# Patient Record
Sex: MALE | Race: WHITE | NOT HISPANIC OR LATINO | Employment: OTHER | URBAN - METROPOLITAN AREA
[De-identification: names, ages, dates, MRNs, and addresses within clinical notes are randomized per-mention and may not be internally consistent; named-entity substitution may affect disease eponyms.]

---

## 2023-02-22 LAB
ALANINE AMINOTRANSFERASE (SGPT) (U/L) IN SER/PLAS: 36 U/L (ref 10–52)
ALBUMIN (G/DL) IN SER/PLAS: 4.3 G/DL (ref 3.4–5)
ALKALINE PHOSPHATASE (U/L) IN SER/PLAS: 40 U/L (ref 33–136)
ANION GAP IN SER/PLAS: 15 MMOL/L (ref 10–20)
ASPARTATE AMINOTRANSFERASE (SGOT) (U/L) IN SER/PLAS: 28 U/L (ref 9–39)
BILIRUBIN TOTAL (MG/DL) IN SER/PLAS: 2.3 MG/DL (ref 0–1.2)
CALCIUM (MG/DL) IN SER/PLAS: 9.7 MG/DL (ref 8.6–10.3)
CARBON DIOXIDE, TOTAL (MMOL/L) IN SER/PLAS: 22 MMOL/L (ref 21–32)
CHLORIDE (MMOL/L) IN SER/PLAS: 104 MMOL/L (ref 98–107)
CHOLESTEROL (MG/DL) IN SER/PLAS: 108 MG/DL (ref 0–199)
CHOLESTEROL IN HDL (MG/DL) IN SER/PLAS: 28 MG/DL
CHOLESTEROL/HDL RATIO: 3.9
CREATININE (MG/DL) IN SER/PLAS: 0.8 MG/DL (ref 0.5–1.3)
ERYTHROCYTE DISTRIBUTION WIDTH (RATIO) BY AUTOMATED COUNT: 13.6 % (ref 11.5–14.5)
ERYTHROCYTE MEAN CORPUSCULAR HEMOGLOBIN CONCENTRATION (G/DL) BY AUTOMATED: 33.1 G/DL (ref 32–36)
ERYTHROCYTE MEAN CORPUSCULAR VOLUME (FL) BY AUTOMATED COUNT: 91 FL (ref 80–100)
ERYTHROCYTES (10*6/UL) IN BLOOD BY AUTOMATED COUNT: 5.52 X10E12/L (ref 4.5–5.9)
GFR MALE: >90 ML/MIN/1.73M2
GLUCOSE (MG/DL) IN SER/PLAS: 108 MG/DL (ref 74–99)
HEMATOCRIT (%) IN BLOOD BY AUTOMATED COUNT: 50.4 % (ref 41–52)
HEMOGLOBIN (G/DL) IN BLOOD: 16.7 G/DL (ref 13.5–17.5)
LDL: 44 MG/DL (ref 0–99)
LEUKOCYTES (10*3/UL) IN BLOOD BY AUTOMATED COUNT: 11 X10E9/L (ref 4.4–11.3)
PLATELETS (10*3/UL) IN BLOOD AUTOMATED COUNT: 169 X10E9/L (ref 150–450)
POTASSIUM (MMOL/L) IN SER/PLAS: 4.2 MMOL/L (ref 3.5–5.3)
PROSTATE SPECIFIC AG (NG/ML) IN SER/PLAS: 2.03 NG/ML (ref 0–4)
PROTEIN TOTAL: 6.8 G/DL (ref 6.4–8.2)
SODIUM (MMOL/L) IN SER/PLAS: 137 MMOL/L (ref 136–145)
THYROTROPIN (MIU/L) IN SER/PLAS BY DETECTION LIMIT <= 0.05 MIU/L: 3.64 MIU/L (ref 0.44–3.98)
TRIGLYCERIDE (MG/DL) IN SER/PLAS: 180 MG/DL (ref 0–149)
UREA NITROGEN (MG/DL) IN SER/PLAS: 14 MG/DL (ref 6–23)
VLDL: 36 MG/DL (ref 0–40)

## 2023-02-23 PROBLEM — R07.9 CHEST PAIN: Status: ACTIVE | Noted: 2023-02-23

## 2023-02-23 PROBLEM — M79.605 BILATERAL LEG AND FOOT PAIN: Status: ACTIVE | Noted: 2023-02-23

## 2023-02-23 PROBLEM — I73.9 PVD (PERIPHERAL VASCULAR DISEASE) (CMS-HCC): Status: ACTIVE | Noted: 2023-02-23

## 2023-02-23 PROBLEM — N40.0 BENIGN PROSTATIC HYPERPLASIA WITHOUT URINARY OBSTRUCTION: Status: ACTIVE | Noted: 2023-02-23

## 2023-02-23 PROBLEM — R94.39 ABNORMAL STRESS ECG WITH TREADMILL: Status: ACTIVE | Noted: 2023-02-23

## 2023-02-23 PROBLEM — E78.5 HYPERLIPIDEMIA: Status: ACTIVE | Noted: 2023-02-23

## 2023-02-23 PROBLEM — K06.1 GINGIVAL HYPERPLASIA: Status: ACTIVE | Noted: 2023-02-23

## 2023-02-23 PROBLEM — I10 BENIGN ESSENTIAL HYPERTENSION: Status: ACTIVE | Noted: 2023-02-23

## 2023-02-23 PROBLEM — R93.1 ELEVATED CORONARY ARTERY CALCIUM SCORE: Status: ACTIVE | Noted: 2023-02-23

## 2023-02-23 PROBLEM — J32.9 SINOBRONCHITIS: Status: ACTIVE | Noted: 2023-02-23

## 2023-02-23 PROBLEM — B35.1 MYCOTIC TOENAILS: Status: ACTIVE | Noted: 2023-02-23

## 2023-02-23 PROBLEM — M79.671 BILATERAL LEG AND FOOT PAIN: Status: ACTIVE | Noted: 2023-02-23

## 2023-02-23 PROBLEM — L60.0 INGROWN TOENAIL: Status: ACTIVE | Noted: 2023-02-23

## 2023-02-23 PROBLEM — M79.672 BILATERAL LEG AND FOOT PAIN: Status: ACTIVE | Noted: 2023-02-23

## 2023-02-23 PROBLEM — E03.9 ADULT HYPOTHYROIDISM: Status: ACTIVE | Noted: 2023-02-23

## 2023-02-23 PROBLEM — E66.812 CLASS 2 OBESITY DUE TO EXCESS CALORIES WITH BODY MASS INDEX (BMI) OF 35.0 TO 35.9 IN ADULT: Status: ACTIVE | Noted: 2023-02-23

## 2023-02-23 PROBLEM — M79.604 BILATERAL LEG AND FOOT PAIN: Status: ACTIVE | Noted: 2023-02-23

## 2023-02-23 PROBLEM — E66.09 CLASS 2 OBESITY DUE TO EXCESS CALORIES WITH BODY MASS INDEX (BMI) OF 35.0 TO 35.9 IN ADULT: Status: ACTIVE | Noted: 2023-02-23

## 2023-02-23 PROBLEM — L57.0 ACTINIC KERATOSIS: Status: ACTIVE | Noted: 2023-02-23

## 2023-02-23 PROBLEM — R10.811 RIGHT UPPER QUADRANT ABDOMINAL TENDERNESS: Status: ACTIVE | Noted: 2023-02-23

## 2023-02-23 PROBLEM — J40 SINOBRONCHITIS: Status: ACTIVE | Noted: 2023-02-23

## 2023-02-23 PROBLEM — R74.8 ELEVATED LIVER ENZYMES: Status: ACTIVE | Noted: 2023-02-23

## 2023-02-23 RX ORDER — METOPROLOL SUCCINATE 100 MG/1
1 TABLET, EXTENDED RELEASE ORAL DAILY
COMMUNITY
End: 2024-02-26 | Stop reason: SDUPTHER

## 2023-02-23 RX ORDER — ROSUVASTATIN CALCIUM 10 MG/1
1 TABLET, COATED ORAL DAILY
COMMUNITY
Start: 2017-01-10 | End: 2023-03-28 | Stop reason: SDUPTHER

## 2023-02-23 RX ORDER — LEVOTHYROXINE SODIUM 50 UG/1
50 TABLET ORAL
COMMUNITY
Start: 2020-09-28 | End: 2023-03-28 | Stop reason: SDUPTHER

## 2023-02-23 RX ORDER — NAPROXEN SODIUM 220 MG/1
TABLET, FILM COATED ORAL
COMMUNITY
Start: 2019-06-06

## 2023-03-28 ENCOUNTER — TELEMEDICINE (OUTPATIENT)
Dept: PRIMARY CARE | Facility: CLINIC | Age: 73
End: 2023-03-28
Payer: MEDICARE

## 2023-03-28 VITALS
DIASTOLIC BLOOD PRESSURE: 78 MMHG | HEIGHT: 72 IN | SYSTOLIC BLOOD PRESSURE: 134 MMHG | BODY MASS INDEX: 34.13 KG/M2 | WEIGHT: 252 LBS

## 2023-03-28 DIAGNOSIS — E03.9 ADULT HYPOTHYROIDISM: ICD-10-CM

## 2023-03-28 DIAGNOSIS — E78.2 MIXED HYPERLIPIDEMIA: ICD-10-CM

## 2023-03-28 DIAGNOSIS — R17 SERUM TOTAL BILIRUBIN ELEVATED: ICD-10-CM

## 2023-03-28 DIAGNOSIS — I73.9 PVD (PERIPHERAL VASCULAR DISEASE) (CMS-HCC): ICD-10-CM

## 2023-03-28 DIAGNOSIS — N40.0 BENIGN PROSTATIC HYPERPLASIA WITHOUT URINARY OBSTRUCTION: ICD-10-CM

## 2023-03-28 DIAGNOSIS — E66.09 CLASS 1 OBESITY DUE TO EXCESS CALORIES WITHOUT SERIOUS COMORBIDITY WITH BODY MASS INDEX (BMI) OF 34.0 TO 34.9 IN ADULT: ICD-10-CM

## 2023-03-28 DIAGNOSIS — E80.6 ACQUIRED HYPERBILIRUBINEMIA: ICD-10-CM

## 2023-03-28 DIAGNOSIS — Z00.00 MEDICARE ANNUAL WELLNESS VISIT, SUBSEQUENT: Primary | ICD-10-CM

## 2023-03-28 DIAGNOSIS — I10 BENIGN ESSENTIAL HYPERTENSION: ICD-10-CM

## 2023-03-28 DIAGNOSIS — R93.1 ELEVATED CORONARY ARTERY CALCIUM SCORE: ICD-10-CM

## 2023-03-28 PROBLEM — E66.811 CLASS 1 OBESITY DUE TO EXCESS CALORIES WITHOUT SERIOUS COMORBIDITY WITH BODY MASS INDEX (BMI) OF 34.0 TO 34.9 IN ADULT: Status: ACTIVE | Noted: 2023-02-23

## 2023-03-28 PROBLEM — R74.8 ELEVATED LIVER ENZYMES: Status: RESOLVED | Noted: 2023-02-23 | Resolved: 2023-03-28

## 2023-03-28 PROCEDURE — G0439 PPPS, SUBSEQ VISIT: HCPCS | Performed by: FAMILY MEDICINE

## 2023-03-28 RX ORDER — LEVOTHYROXINE SODIUM 50 UG/1
50 TABLET ORAL DAILY
Qty: 90 TABLET | Refills: 3 | Status: SHIPPED | OUTPATIENT
Start: 2023-03-28 | End: 2024-02-26 | Stop reason: SDUPTHER

## 2023-03-28 RX ORDER — ROSUVASTATIN CALCIUM 10 MG/1
10 TABLET, COATED ORAL DAILY
Qty: 90 TABLET | Refills: 3 | Status: SHIPPED | OUTPATIENT
Start: 2023-03-28 | End: 2024-02-26 | Stop reason: SDUPTHER

## 2023-03-28 ASSESSMENT — ACTIVITIES OF DAILY LIVING (ADL)
BATHING: INDEPENDENT
TAKING_MEDICATION: INDEPENDENT
MANAGING_FINANCES: INDEPENDENT
GROCERY_SHOPPING: INDEPENDENT
DRESSING: INDEPENDENT
DOING_HOUSEWORK: INDEPENDENT

## 2023-03-28 ASSESSMENT — ENCOUNTER SYMPTOMS
DEPRESSION: 0
LOSS OF SENSATION IN FEET: 0
OCCASIONAL FEELINGS OF UNSTEADINESS: 0

## 2023-03-28 ASSESSMENT — PATIENT HEALTH QUESTIONNAIRE - PHQ9
2. FEELING DOWN, DEPRESSED OR HOPELESS: NOT AT ALL
SUM OF ALL RESPONSES TO PHQ9 QUESTIONS 1 AND 2: 0
1. LITTLE INTEREST OR PLEASURE IN DOING THINGS: NOT AT ALL

## 2023-03-28 NOTE — ASSESSMENT & PLAN NOTE
Continues to be elevated and a bit higher. Plan to start with ultrasound, but may need CT of abdomen if inconclusive. Looking at liver, gall ladder and pancreas.

## 2023-03-28 NOTE — PROGRESS NOTES
"Subjective   Reason for Visit: Yadiel Solano is an 73 y.o. male here for a Medicare Wellness visit.   Just returned from cruise, and had a nice time, [place] [facility], [facility], [place]   Past Medical, Surgical, and Family History reviewed and updated in chart.    Reviewed all medications by prescribing practitioner or clinical pharmacist (such as prescriptions, OTCs, herbal therapies and supplements) and documented in the medical record.    Med Refill      Medications reconciled, no change  Patient Care Team:  Jennifer Willis MD as PCP - General     Review of Systems   All other systems reviewed and are negative.      Objective   Vitals:  /78   Ht 1.816 m (5' 11.5\")   Wt 114 kg (252 lb)   BMI 34.66 kg/m²       Physical Exam  Vitals reviewed.     An interactive audio and video telecommunication system which permits real time communications between the patient (at the originating site) and provider (at a distant site) was utilized to provider this telehealth service.     Assessment/Plan   Problem List Items Addressed This Visit          Circulatory    Benign essential hypertension    Elevated coronary artery calcium score    PVD (peripheral vascular disease) (CMS/[facility])       Genitourinary    Benign prostatic hyperplasia without urinary obstruction       Endocrine/Metabolic    Adult hypothyroidism    Relevant Medications    levothyroxine (Synthroid, Levoxyl) 50 mcg tablet    Class 1 obesity due to excess calories without serious comorbidity with body mass index (BMI) of 34.0 to 34.9 in adult       Other    Hyperlipidemia    Acquired hyperbilirubinemia    Current Assessment & Plan     Continues to be elevated and a bit higher. Plan to start with ultrasound, but may need CT of abdomen if inconclusive. Looking at liver, gall ladder and pancreas.          Relevant Medications    rosuvastatin (Crestor) 10 mg tablet     Other Visit Diagnoses       Medicare annual wellness visit, subsequent    -  Primary    " Serum total bilirubin elevated

## 2023-05-03 NOTE — RESULT ENCOUNTER NOTE
Okay Yadiel, your liver and gall bladder looked good, and they did not feel that was the reason that your bilirubin was up, however, they suggested either a HIDA scan or a surgical referral to further determine if the gall bladder needs to come out because there are stones. Let me know your thoughts! (Sent via My chart message as well)

## 2023-05-04 ENCOUNTER — TELEPHONE (OUTPATIENT)
Dept: PRIMARY CARE | Facility: CLINIC | Age: 73
End: 2023-05-04
Payer: MEDICARE

## 2023-05-04 DIAGNOSIS — E80.6 ACQUIRED HYPERBILIRUBINEMIA: Primary | ICD-10-CM

## 2023-05-04 DIAGNOSIS — K80.20 CALCULUS OF GALLBLADDER WITHOUT CHOLECYSTITIS WITHOUT OBSTRUCTION: ICD-10-CM

## 2023-05-04 NOTE — TELEPHONE ENCOUNTER
Pt states you sent email about gall bladder. He would like more information about the other test and would also like to know if it would be easier to just have the gall bladder out.

## 2023-05-26 NOTE — RESULT ENCOUNTER NOTE
So gall bladder function test showed decreased contractility and  decreased ejection fraction. This means your gall badder may need to come out. The next step would be to talk to  surgeon- which hospital would you like to do that at?

## 2023-06-26 ENCOUNTER — APPOINTMENT (OUTPATIENT)
Dept: LAB | Facility: LAB | Age: 73
End: 2023-06-26
Payer: MEDICARE

## 2023-06-26 LAB
ANION GAP IN SER/PLAS: 13 MMOL/L (ref 10–20)
CALCIUM (MG/DL) IN SER/PLAS: 9.5 MG/DL (ref 8.6–10.3)
CARBON DIOXIDE, TOTAL (MMOL/L) IN SER/PLAS: 27 MMOL/L (ref 21–32)
CHLORIDE (MMOL/L) IN SER/PLAS: 102 MMOL/L (ref 98–107)
CREATININE (MG/DL) IN SER/PLAS: 0.93 MG/DL (ref 0.5–1.3)
ERYTHROCYTE DISTRIBUTION WIDTH (RATIO) BY AUTOMATED COUNT: 14.1 % (ref 11.5–14.5)
ERYTHROCYTE MEAN CORPUSCULAR HEMOGLOBIN CONCENTRATION (G/DL) BY AUTOMATED: 32.4 G/DL (ref 32–36)
ERYTHROCYTE MEAN CORPUSCULAR VOLUME (FL) BY AUTOMATED COUNT: 92 FL (ref 80–100)
ERYTHROCYTES (10*6/UL) IN BLOOD BY AUTOMATED COUNT: 5.57 X10E12/L (ref 4.5–5.9)
GFR MALE: 88 ML/MIN/1.73M2
GLUCOSE (MG/DL) IN SER/PLAS: 115 MG/DL (ref 74–99)
HEMATOCRIT (%) IN BLOOD BY AUTOMATED COUNT: 51.3 % (ref 41–52)
HEMOGLOBIN (G/DL) IN BLOOD: 16.6 G/DL (ref 13.5–17.5)
LEUKOCYTES (10*3/UL) IN BLOOD BY AUTOMATED COUNT: 12.3 X10E9/L (ref 4.4–11.3)
PLATELETS (10*3/UL) IN BLOOD AUTOMATED COUNT: 167 X10E9/L (ref 150–450)
POTASSIUM (MMOL/L) IN SER/PLAS: 4.2 MMOL/L (ref 3.5–5.3)
SODIUM (MMOL/L) IN SER/PLAS: 138 MMOL/L (ref 136–145)
UREA NITROGEN (MG/DL) IN SER/PLAS: 16 MG/DL (ref 6–23)

## 2023-07-18 ENCOUNTER — HOSPITAL ENCOUNTER (OUTPATIENT)
Dept: DATA CONVERSION | Facility: HOSPITAL | Age: 73
End: 2023-07-19
Attending: SURGERY | Admitting: SURGERY
Payer: MEDICARE

## 2023-07-18 DIAGNOSIS — Z79.82 LONG TERM (CURRENT) USE OF ASPIRIN: ICD-10-CM

## 2023-07-18 DIAGNOSIS — N40.0 BENIGN PROSTATIC HYPERPLASIA WITHOUT LOWER URINARY TRACT SYMPTOMS: ICD-10-CM

## 2023-07-18 DIAGNOSIS — E78.5 HYPERLIPIDEMIA, UNSPECIFIED: ICD-10-CM

## 2023-07-18 DIAGNOSIS — E66.9 OBESITY, UNSPECIFIED: ICD-10-CM

## 2023-07-18 DIAGNOSIS — K80.10 CALCULUS OF GALLBLADDER WITH CHRONIC CHOLECYSTITIS WITHOUT OBSTRUCTION: ICD-10-CM

## 2023-07-18 DIAGNOSIS — I73.9 PERIPHERAL VASCULAR DISEASE, UNSPECIFIED (CMS-HCC): ICD-10-CM

## 2023-07-18 DIAGNOSIS — I10 ESSENTIAL (PRIMARY) HYPERTENSION: ICD-10-CM

## 2023-07-18 DIAGNOSIS — Z88.0 ALLERGY STATUS TO PENICILLIN: ICD-10-CM

## 2023-07-18 DIAGNOSIS — Z23 ENCOUNTER FOR IMMUNIZATION: ICD-10-CM

## 2023-07-18 DIAGNOSIS — K81.9 CHOLECYSTITIS, UNSPECIFIED: ICD-10-CM

## 2023-07-18 DIAGNOSIS — K80.20 CALCULUS OF GALLBLADDER WITHOUT CHOLECYSTITIS WITHOUT OBSTRUCTION: ICD-10-CM

## 2023-07-18 DIAGNOSIS — E03.9 HYPOTHYROIDISM, UNSPECIFIED: ICD-10-CM

## 2023-07-22 LAB
ATRIAL RATE: 70 BPM
P AXIS: 56 DEGREES
P OFFSET: 196 MS
P ONSET: 137 MS
PR INTERVAL: 170 MS
Q ONSET: 222 MS
QRS COUNT: 12 BEATS
QRS DURATION: 92 MS
QT INTERVAL: 408 MS
QTC CALCULATION(BAZETT): 440 MS
QTC FREDERICIA: 429 MS
R AXIS: -1 DEGREES
T AXIS: 31 DEGREES
T OFFSET: 426 MS
VENTRICULAR RATE: 70 BPM

## 2023-07-31 LAB
COMPLETE PATHOLOGY REPORT: NORMAL
CONVERTED CLINICAL DIAGNOSIS-HISTORY: NORMAL
CONVERTED FINAL DIAGNOSIS: NORMAL
CONVERTED FINAL REPORT PDF LINK TO COPY AND PASTE: NORMAL
CONVERTED GROSS DESCRIPTION: NORMAL
ERYTHROCYTE DISTRIBUTION WIDTH (RATIO) BY AUTOMATED COUNT: 14.8 % (ref 11.5–14.5)
ERYTHROCYTE MEAN CORPUSCULAR HEMOGLOBIN CONCENTRATION (G/DL) BY AUTOMATED: 31.3 G/DL (ref 32–36)
ERYTHROCYTE MEAN CORPUSCULAR VOLUME (FL) BY AUTOMATED COUNT: 95 FL (ref 80–100)
ERYTHROCYTES (10*6/UL) IN BLOOD BY AUTOMATED COUNT: 4.75 X10E12/L (ref 4.5–5.9)
HEMATOCRIT (%) IN BLOOD BY AUTOMATED COUNT: 45.3 % (ref 41–52)
HEMOGLOBIN (G/DL) IN BLOOD: 14.2 G/DL (ref 13.5–17.5)
LEUKOCYTES (10*3/UL) IN BLOOD BY AUTOMATED COUNT: 12.2 X10E9/L (ref 4.4–11.3)
PLATELETS (10*3/UL) IN BLOOD AUTOMATED COUNT: 218 X10E9/L (ref 150–450)

## 2023-08-07 ENCOUNTER — TELEPHONE (OUTPATIENT)
Dept: PRIMARY CARE | Facility: CLINIC | Age: 73
End: 2023-08-07
Payer: MEDICARE

## 2023-08-07 NOTE — LETTER
August 14, 2023     Yadiel Solano  [address] [place] [address]    Patient: Yadiel Solano   YOB: 1950   Date of Visit: 8/7/2023       To Whom it May Concern:     Mr Solano has been my patient for years. He recently had gall bladder surgery and in addition he is legally blind. He requires the assistance of someone else to leave the home. He is a fall risk. He would not be a candidate for Jury Duty at this time. Please excuse him.        Sincerely,                     Jennifer Willis MD   [address] [id number] [phone]  Fax [phone]  [email or web address] ___________________________________________________________

## 2023-09-29 VITALS — BODY MASS INDEX: 33.83 KG/M2 | HEIGHT: 72 IN | WEIGHT: 249.78 LBS

## 2023-09-30 NOTE — H&P
History & Physical Reviewed:   I have reviewed the History and Physical dated:  26-Jun-2023   History and Physical reviewed and relevant findings noted. Patient examined to review pertinent physical  findings.: No significant changes   Home Medications Reviewed: no changes noted   Allergies Reviewed: no changes noted       ERAS (Enhanced Recovery After Surgery):  ·  ERAS Patient: no     Consent:   COVID-19 Consent:  ·  COVID-19 Risk Consent Surgeon has reviewed key risks related to the risk of talha COVID-19 and if they contract COVID-19 what the risks are.       Electronic Signatures:  Arlette Acuna)  (Signed 18-Jul-2023 12:16)   Authored: History & Physical Reviewed, ERAS, Consent,  Note Completion      Last Updated: 18-Jul-2023 12:16 by Arlette Acuna)

## 2023-09-30 NOTE — DISCHARGE SUMMARY
Send Summary:   Discharge Summary Providers:  Provider Role Provider Name   · Attending Arlette Acuna   · Referring Jennifer Willis   · Primary Jennifer Willis       Note Recipients: Jennifer Willis MD Madden, Maria, MD       Discharge:    Summary:   Admission Date: .18-Jul-2023 10:33:00   Discharge Date: 19-Jul-2023   Attending Physician at Discharge: Arlette Acuna   Admission Reason: cholelithiasis   Final Discharge Diagnoses: Laparoscopic Cholecystectomy   Procedures: Date: 18-Jul-2023 14:26:00  Procedure Name: 1.   2.   3. lap obey  4.   5.   Condition at Discharge: Satisfactory   Disposition at Discharge: .Home   Vital Signs:        T   P  R  BP   MAP  SpO2   Value  36.4  97  18  128/81      94%  Date/Time 7/19 5:02 7/19 5:02 7/19 5:02 7/19 5:02    7/19 5:02  Range  (36.2C - 36.4C )  (76 - 97 )  (16 - 18 )  (124 - 132 )/ (81 - 85 )    (90% - 95% )   As of 18-Jul-2023 15:52:00, patient is on 2 L/min of oxygen via room air.    Date:            Weight/Scale Type:  Height:   18-Jul-2023 15:44  113.3  kg / bed  182.8  cm  Physical Exam:    PHYSICAL EXAM:  General:  NAD, well-nourished, well-developed  HEENT:  NC/AT, MMM, PERRLA, EOMI  Pulmonary: CTA bilaterally, neck supple, no LAD  Cardiovascular:  RRR, no M/R/G  Abdomen: soft, NT/ND, +BSx4.  All incisions are intact, dry and healing well  Extremities: no C/C/E, PPPx4, GRICELDA x4  Neurological: CN II-XII intact, gait WNL, no focal abnormalities  Skin: dry and warm, no rashes    Hospital Course:    73 year old male with retinitis pigmentosa and is completely blind; able to see shadows.  Patient has known cholelithiasis therefore he underwent a Laparoscopic Cholecystectomy  on 7-18-23 with Dr. Acuna.  He tolerated the procedure well and there were no complications.  We kept him overnight for pain control and he was doing well the following morning.  We managed his surgical pain with acetaminophen and encouraged him to ambulate  frequently.  The  patient was tolerating a full liquid diet and ambulating without difficulty therefore he was discharged home with instructions to follow-up with Dr. Acuna in two weeks.       Discharge Information:    and Continuing Care:   Lab Results - Pending:    Surgical Pathology Drawn at 18-Jul-2023 13:49:00  Radiology Results - Pending: None   Discharge Instructions:    Activity:           activity as tolerated.          May shower..            May not return to school/work.            May drive..  Beginning in 7-10 days          No pushing, pulling, or lifting objects greater than 10 pounds for 6 week(s).            Up and down the stairs is fine.  Walk around intermittently throughout the day otherwise rest and recover from your surgery.    Nutrition/Diet:           full liquids,  Stay on a full liquid diet until you begin to move your bowels.  Then advance to a soft diet, then regular diet.          Encourage Fluids:   Drink plenty of water daily, stay hydrated.    Wound Care:           Wound Site:   Abdomen          Wound Type:   surgical incision          Instructions:   no lotions, creams, or tub soaks          Other Instructions:   Leave your incisions open to air.    Follow Up Appointments:    Follow-Up Appointment 01:           Physician/Dept/Service:   Dr. Acuna          Reason for Referral:   General Surgery          Call to Schedule in:   2 weeks          Scheduled Date/Time:   31-Aug-2023 01:15          Location:   [address] [place] [address], [address] [address]          Phone Number:   [phone]    Discharge Medications: Home Medication   aspirin - orally once a day  levothyroxine - 50  orally once a day  metoprolol - 100  orally once a day  ROUVASTATIN - 10   once a day     PRN Medication   acetaminophen 500 mg oral tablet - 2 tab(s) orally every 6 hours, As Needed   Issues to Discuss at Follow-up / Goals for Continuing Care:    GB pathology  pain  activity  He and wife need colonoscopies    DNR Status:    ·  Code Status Code Status order at time of discharge: Full Code       Electronic Signatures:  Shila Ch (PA (PHYSICIAN))  (Signed 19-Jul-2023 10:56)   Authored: Send Summary, Summary Content, Ongoing Care,  DNR Status, Note Completion      Last Updated: 19-Jul-2023 10:56 by Shila Ch (PA (PHYSICIAN))

## 2023-10-02 NOTE — OP NOTE
Post Operative Note:     Post-Procedure Diagnosis: cholelithiasis   Procedure: 1.   2.   3. lap obey  4.   5.   Surgeon: Armand   Resident/Fellow/Other Assistant:    Estimated Blood Loss (mL): none   Specimen: yes. gb   Findings: none     Attestation:   Note Completion:  Attending Attestation I performed the procedure without a resident         Electronic Signatures:  Arlette Acuna)  (Signed 18-Jul-2023 14:28)   Authored: Post Operative Note, Note Completion      Last Updated: 18-Jul-2023 14:28 by Arlette Acuna)

## 2024-01-08 ENCOUNTER — TELEPHONE (OUTPATIENT)
Dept: PRIMARY CARE | Facility: CLINIC | Age: 74
End: 2024-01-08
Payer: MEDICARE

## 2024-01-08 DIAGNOSIS — Z12.11 COLON CANCER SCREENING: Primary | ICD-10-CM

## 2024-01-08 NOTE — TELEPHONE ENCOUNTER
Needs a referral for a colonoscopy, its been 10 years since he last had one. Please send to Dr. Acuna her phone [phone].

## 2024-01-10 ENCOUNTER — OFFICE VISIT (OUTPATIENT)
Dept: OTOLARYNGOLOGY | Facility: CLINIC | Age: 74
End: 2024-01-10
Payer: MEDICARE

## 2024-01-10 DIAGNOSIS — H91.91 DECREASED HEARING OF RIGHT EAR: Primary | ICD-10-CM

## 2024-01-10 DIAGNOSIS — H69.93 DISORDER OF BOTH EUSTACHIAN TUBES: ICD-10-CM

## 2024-01-10 PROCEDURE — 99204 OFFICE O/P NEW MOD 45 MIN: CPT | Performed by: OTOLARYNGOLOGY

## 2024-01-10 PROCEDURE — 1126F AMNT PAIN NOTED NONE PRSNT: CPT | Performed by: OTOLARYNGOLOGY

## 2024-01-10 PROCEDURE — 1036F TOBACCO NON-USER: CPT | Performed by: OTOLARYNGOLOGY

## 2024-01-10 PROCEDURE — 3008F BODY MASS INDEX DOCD: CPT | Performed by: OTOLARYNGOLOGY

## 2024-01-10 RX ORDER — PREDNISONE 10 MG/1
TABLET ORAL
Qty: 34 TABLET | Refills: 0 | Status: SHIPPED | OUTPATIENT
Start: 2024-01-10 | End: 2024-01-29 | Stop reason: WASHOUT

## 2024-01-10 RX ORDER — DOXYCYCLINE HYCLATE 100 MG
100 TABLET ORAL 2 TIMES DAILY
Qty: 20 TABLET | Refills: 0 | Status: SHIPPED | OUTPATIENT
Start: 2024-01-10 | End: 2024-01-20

## 2024-01-10 NOTE — PROGRESS NOTES
History Of Present Illness  Yadiel Solano is a 73 y.o. male,    Patient had a flight about two months ago, after that he developed right ear pain, fullness. He has tried z pack but could not find relief.     My impression, he has eustachian tube disorder.    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    Surgical History  He has a past surgical history that includes Hernia repair (03/07/2016) and Tonsillectomy (03/07/2016).     Social History  He reports that he has never smoked. He has never used smokeless tobacco. He reports that he does not drink alcohol and does not use drugs.    Family History  Family History   Problem Relation Name Age of Onset    Uterine cancer Mother          Allergies  Amoxicillin-pot clavulanate and Penicillins    Review of Systems        Physical Exam    General appearance: Healthy-appearing, well-nourished, well groomed, in no acute distress.     Head and Face: Atraumatic with no masses, lesions, or scarring.      Salivary glands: No tenderness of the parotid glands or parotid masses.     No tenderness of the submandibular glands or submandibular masses.      Facial strength: Normal strength and symmetry, no synkinesis or facial tic.     Eyes: Conjunctivas look non-hyperemic bilaterally    Ears: Bilaterally ear canals look normal. Tympanic membranes look intact, no hyperemia, fluid or retraction. Hearing grossly normal.      Nose: Mucosa looks normal. No purulent discharge. Septum deviated to right    Oral Cavity/Mouth: Lips and tongue look normal.     Throat: No postnasal discharge. No tonsil  Problem: Patient Care Overview (Adult)  Goal: Adult Individualization and Mutuality  Outcome: Ongoing (interventions implemented as appropriate)   02/09/18 0703   Individualization   Patient Specific Preferences goes by Errol          hypertrophy. No hyperemia.    Neck: Symmetrical, trachea midline.     Pulmonary: Normal respiratory effort.     Lymphatic: No palpable pathologic lymph nodes at neck.     Neurological/Psychiatric Orientation to person, place, and time: Normal.     Mood and affect: Normal.      Extremities: No clubbing.     Skin: No significant skin lesions were noted at face or neck        Procedure       Last Recorded Vitals  There were no vitals taken for this visit.    Relevant Results  Prior to Admission medications    Medication Sig Start Date End Date Taking? Authorizing Provider   aspirin 81 mg chewable tablet Chew once daily. CHEW AND SWALLOW 1 TABLET DAILY 6/6/19   Historical Provider, MD   levothyroxine (Synthroid, Levoxyl) 50 mcg tablet Take 1 tablet (50 mcg) by mouth once daily. 3/28/23 3/27/24  Jennifer Willis MD   metoprolol succinate XL (Toprol-XL) 100 mg 24 hr tablet Take 1 tablet (100 mg) by mouth once daily.    Historical Provider, MD   rosuvastatin (Crestor) 10 mg tablet Take 1 tablet (10 mg) by mouth once daily. 3/28/23 3/27/24  Jennifer Willis MD     No results found.      Assessment and Plan:  Yadiel Solano is a 73 y.o. male,    Patient had a flight about two months ago, after that he developed right ear pain, fullness. He has tried z pack but could not find relief.     My impression, he has eustachian tube disorder.    Plan:  1- oral prednisone and doxycyline  2- follow up with hearing test    Tierra Glover MD  Otolaryngology - Head & Neck Surgery

## 2024-01-29 ENCOUNTER — CLINICAL SUPPORT (OUTPATIENT)
Dept: AUDIOLOGY | Facility: CLINIC | Age: 74
End: 2024-01-29
Payer: MEDICARE

## 2024-01-29 ENCOUNTER — OFFICE VISIT (OUTPATIENT)
Dept: OTOLARYNGOLOGY | Facility: CLINIC | Age: 74
End: 2024-01-29
Payer: MEDICARE

## 2024-01-29 DIAGNOSIS — H93.13 SUBJECTIVE TINNITUS OF BOTH EARS: ICD-10-CM

## 2024-01-29 DIAGNOSIS — H90.3 SENSORINEURAL HEARING LOSS (SNHL) OF BOTH EARS: Primary | ICD-10-CM

## 2024-01-29 DIAGNOSIS — H91.93 DECREASED HEARING OF BOTH EARS: Primary | ICD-10-CM

## 2024-01-29 PROCEDURE — 1126F AMNT PAIN NOTED NONE PRSNT: CPT | Performed by: OTOLARYNGOLOGY

## 2024-01-29 PROCEDURE — 1036F TOBACCO NON-USER: CPT | Performed by: OTOLARYNGOLOGY

## 2024-01-29 PROCEDURE — 99213 OFFICE O/P EST LOW 20 MIN: CPT | Performed by: OTOLARYNGOLOGY

## 2024-01-29 PROCEDURE — 92557 COMPREHENSIVE HEARING TEST: CPT | Performed by: AUDIOLOGIST

## 2024-01-29 PROCEDURE — 92550 TYMPANOMETRY & REFLEX THRESH: CPT | Performed by: AUDIOLOGIST

## 2024-01-29 PROCEDURE — 3008F BODY MASS INDEX DOCD: CPT | Performed by: OTOLARYNGOLOGY

## 2024-01-29 ASSESSMENT — ENCOUNTER SYMPTOMS
DEPRESSION: 0
OCCASIONAL FEELINGS OF UNSTEADINESS: 0
LOSS OF SENSATION IN FEET: 0

## 2024-01-29 ASSESSMENT — PAIN SCALES - GENERAL: PAINLEVEL_OUTOF10: 0 - NO PAIN

## 2024-01-29 ASSESSMENT — PAIN - FUNCTIONAL ASSESSMENT: PAIN_FUNCTIONAL_ASSESSMENT: 0-10

## 2024-01-29 NOTE — PROGRESS NOTES
History Of Present Illness    01.29.24: He had his hearing test today. It shows bilateral acoustic trauma (he was a  -  siren noise). Type a tympanograms bilaterally. He says his ears are getting better. He has used oral prednisone and doxycycline.     I recommend to follow up in 2-3 weeks, if the ears do not go back to baseline.  ____________________________________________________________________    Yadiel Solano is a 74 y.o. male,    Patient had a flight about two months ago, after that he developed right ear pain, fullness. He has tried z pack but could not find relief.     My impression, he has eustachian tube disorder.    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    Surgical History  He has a past surgical history that includes Hernia repair (03/07/2016) and Tonsillectomy (03/07/2016).     Social History  He reports that he has never smoked. He has never used smokeless tobacco. He reports that he does not drink alcohol and does not use drugs.    Family History  Family History   Problem Relation Name Age of Onset    Uterine cancer Mother          Allergies  Amoxicillin-pot clavulanate and Penicillins    Review of Systems   Ear problem     Physical Exam (old exam)    General appearance: Healthy-appearing, well-nourished, well groomed, in no acute distress.     Head and Face: Atraumatic with no masses, lesions, or scarring.      Salivary glands: No tenderness of the parotid glands or parotid masses.     No tenderness of the submandibular glands or submandibular masses.      Facial strength: Normal strength and  symmetry, no synkinesis or facial tic.     Eyes: Conjunctivas look non-hyperemic bilaterally    Ears: Bilaterally ear canals look normal. Tympanic membranes look intact, no hyperemia, fluid or retraction. Hearing grossly normal.      Nose: Mucosa looks normal. No purulent discharge. Septum deviated to right    Oral Cavity/Mouth: Lips and tongue look normal.     Throat: No postnasal discharge. No tonsil hypertrophy. No hyperemia.    Neck: Symmetrical, trachea midline.     Pulmonary: Normal respiratory effort.     Lymphatic: No palpable pathologic lymph nodes at neck.     Neurological/Psychiatric Orientation to person, place, and time: Normal.     Mood and affect: Normal.      Extremities: No clubbing.     Skin: No significant skin lesions were noted at face or neck        Procedure       Last Recorded Vitals  There were no vitals taken for this visit.    Relevant Results  Prior to Admission medications    Medication Sig Start Date End Date Taking? Authorizing Provider   aspirin 81 mg chewable tablet Chew once daily. CHEW AND SWALLOW 1 TABLET DAILY 6/6/19   Historical Provider, MD   levothyroxine (Synthroid, Levoxyl) 50 mcg tablet Take 1 tablet (50 mcg) by mouth once daily. 3/28/23 3/27/24  Jennifer Willis MD   metoprolol succinate XL (Toprol-XL) 100 mg 24 hr tablet Take 1 tablet (100 mg) by mouth once daily.    Historical Provider, MD   rosuvastatin (Crestor) 10 mg tablet Take 1 tablet (10 mg) by mouth once daily. 3/28/23 3/27/24  Jennifer Willis MD     No results found.      Assessment and Plan:    01.29.24: He had his hearing test today. It shows bilateral acoustic trauma (he was a  -  siren noise). Type a tympanograms bilaterally. He says his ears are getting better. He has used oral prednisone and doxycycline.     I recommend to follow up in 2-3 weeks, if the ears do not go back to baseline.  ____________________________________________________________________    Yadiel Solano is a 73 y.o.  male,    Patient had a flight about two months ago, after that he developed right ear pain, fullness. He has tried z pack but could not find relief.     My impression, he has eustachian tube disorder.    Plan:  1- oral prednisone and doxycyline  2- follow up with hearing test    Tierra Curran  Otolaryngology - Head & Neck Surgery

## 2024-01-29 NOTE — PROGRESS NOTES
Yadiel Solano, age 74 years, is here today for a hearing evaluation.     Difficulty hearing - yes, both ears occasionally   Tinnitus - yes, both ears (occasionally)  Excessive noise exposure - yes  Chronic ear infections - no  Ear pain - no  Ear drainage - no  Past ear surgery - no  Vertigo - yes, when standing up quickly  Past hearing aid use - no  Family history - no    Appointment time: 9:10-10    Otoscopy revealed clear ear canals with visual inspection of the tympanic membranes bilaterally.    Behavioral hearing evaluation:  Right ear - normal hearing sensitivity 125-1500 Hz sloping sloping to mild - moderate 8528-7684 Hz rising to normal 1365-8668 Hz  Left ear - normal hearing sensitivity 125-1500 Hz sloping to mild - moderate 8936-7559 Hz    Speech reception thresholds obtained at a level consistent with pure tone thresholds bilaterally.    Word discrimination:  Right ear - excellent (100%)  Left ear - excellent (100%)    Tympanometry:  Right ear - Type A, normal middle ear function  Left ear - Type A, normal middle ear function    Ipsilateral acoustic reflexes:  Probe right - present 500-4000 Hz  Probe left - elevated/absent 500-4000 Hz    Contralateral acoustic reflexes:  Probe right - present 500-4000 Hz  Probe left - absent 500-4000 Hz    Recommendations:  1) Follow up with Dr Glover (elevated absent acoustic reflexes probe left and slight asymmetry wit the left ear being worse)  2) Hearing aids for both ears -he is not interested at this time  3) Re-evaluate hearing annually, to monitor, or sooner if a change in hearing is noted

## 2024-02-05 ENCOUNTER — APPOINTMENT (OUTPATIENT)
Dept: LAB | Facility: HOSPITAL | Age: 74
End: 2024-02-05
Payer: MEDICARE

## 2024-02-05 ENCOUNTER — LAB REQUISITION (OUTPATIENT)
Dept: LAB | Facility: HOSPITAL | Age: 74
End: 2024-02-05
Payer: MEDICARE

## 2024-02-05 ENCOUNTER — OFFICE VISIT (OUTPATIENT)
Dept: SURGERY | Facility: CLINIC | Age: 74
End: 2024-02-05
Payer: MEDICARE

## 2024-02-05 VITALS
DIASTOLIC BLOOD PRESSURE: 71 MMHG | WEIGHT: 252 LBS | HEIGHT: 71 IN | TEMPERATURE: 97 F | HEART RATE: 74 BPM | BODY MASS INDEX: 35.28 KG/M2 | SYSTOLIC BLOOD PRESSURE: 123 MMHG | OXYGEN SATURATION: 95 %

## 2024-02-05 DIAGNOSIS — Z12.11 COLON CANCER SCREENING: ICD-10-CM

## 2024-02-05 DIAGNOSIS — D72.829 ELEVATED WHITE BLOOD CELL COUNT, UNSPECIFIED: ICD-10-CM

## 2024-02-05 LAB
ALBUMIN SERPL BCP-MCNC: 4.2 G/DL (ref 3.4–5)
ALP SERPL-CCNC: 38 U/L (ref 33–136)
ALT SERPL W P-5'-P-CCNC: 33 U/L (ref 10–52)
ANION GAP SERPL CALC-SCNC: 17 MMOL/L (ref 10–20)
AST SERPL W P-5'-P-CCNC: 26 U/L (ref 9–39)
BASOPHILS # BLD AUTO: 0.04 X10*3/UL (ref 0–0.1)
BASOPHILS NFR BLD AUTO: 0.4 %
BILIRUB SERPL-MCNC: 2.5 MG/DL (ref 0–1.2)
BUN SERPL-MCNC: 13 MG/DL (ref 6–23)
CALCIUM SERPL-MCNC: 9 MG/DL (ref 8.6–10.3)
CHLORIDE SERPL-SCNC: 106 MMOL/L (ref 98–107)
CO2 SERPL-SCNC: 18 MMOL/L (ref 21–32)
CREAT SERPL-MCNC: 0.84 MG/DL (ref 0.5–1.3)
EGFRCR SERPLBLD CKD-EPI 2021: >90 ML/MIN/1.73M*2
EOSINOPHIL # BLD AUTO: 0.23 X10*3/UL (ref 0–0.4)
EOSINOPHIL NFR BLD AUTO: 2.4 %
ERYTHROCYTE [DISTWIDTH] IN BLOOD BY AUTOMATED COUNT: 14 % (ref 11.5–14.5)
GLUCOSE SERPL-MCNC: 103 MG/DL (ref 74–99)
HCT VFR BLD AUTO: 48.3 % (ref 41–52)
HGB BLD-MCNC: 16.1 G/DL (ref 13.5–17.5)
IMM GRANULOCYTES # BLD AUTO: 0.02 X10*3/UL (ref 0–0.5)
IMM GRANULOCYTES NFR BLD AUTO: 0.2 % (ref 0–0.9)
LYMPHOCYTES # BLD AUTO: 6.14 X10*3/UL (ref 0.8–3)
LYMPHOCYTES NFR BLD AUTO: 64.8 %
MCH RBC QN AUTO: 30.3 PG (ref 26–34)
MCHC RBC AUTO-ENTMCNC: 33.3 G/DL (ref 32–36)
MCV RBC AUTO: 91 FL (ref 80–100)
MONOCYTES # BLD AUTO: 0.48 X10*3/UL (ref 0.05–0.8)
MONOCYTES NFR BLD AUTO: 5.1 %
NEUTROPHILS # BLD AUTO: 2.57 X10*3/UL (ref 1.6–5.5)
NEUTROPHILS NFR BLD AUTO: 27.1 %
PLATELET # BLD AUTO: 123 X10*3/UL (ref 150–450)
POTASSIUM SERPL-SCNC: 4.3 MMOL/L (ref 3.5–5.3)
PROT SERPL-MCNC: 6.3 G/DL (ref 6.4–8.2)
RBC # BLD AUTO: 5.32 X10*6/UL (ref 4.5–5.9)
SODIUM SERPL-SCNC: 137 MMOL/L (ref 136–145)
WBC # BLD AUTO: 9.5 X10*3/UL (ref 4.4–11.3)

## 2024-02-05 PROCEDURE — 85025 COMPLETE CBC W/AUTO DIFF WBC: CPT

## 2024-02-05 PROCEDURE — 1159F MED LIST DOCD IN RCRD: CPT | Performed by: SURGERY

## 2024-02-05 PROCEDURE — 3074F SYST BP LT 130 MM HG: CPT | Performed by: SURGERY

## 2024-02-05 PROCEDURE — 3078F DIAST BP <80 MM HG: CPT | Performed by: SURGERY

## 2024-02-05 PROCEDURE — 80053 COMPREHEN METABOLIC PANEL: CPT

## 2024-02-05 PROCEDURE — 1126F AMNT PAIN NOTED NONE PRSNT: CPT | Performed by: SURGERY

## 2024-02-05 PROCEDURE — 3008F BODY MASS INDEX DOCD: CPT | Performed by: SURGERY

## 2024-02-05 PROCEDURE — 1036F TOBACCO NON-USER: CPT | Performed by: SURGERY

## 2024-02-05 PROCEDURE — 99213 OFFICE O/P EST LOW 20 MIN: CPT | Performed by: SURGERY

## 2024-02-05 ASSESSMENT — PAIN SCALES - GENERAL: PAINLEVEL: 0-NO PAIN

## 2024-02-05 ASSESSMENT — ENCOUNTER SYMPTOMS
OCCASIONAL FEELINGS OF UNSTEADINESS: 1
DEPRESSION: 0
LOSS OF SENSATION IN FEET: 0

## 2024-02-06 NOTE — PROGRESS NOTES
Subjective   Patient ID: Yadiel Solano is a 74 y.o. male who presents for New Patient Visit (NPV Colonoscopy Consult ) and Colonoscopy Consult.  HPI  This is a pleasant gentleman that I taken care of in the past.  He is here to discuss a colonoscopy.  His last 1 was at least 10 years ago if not longer.  Prior to that he did have polyps but the most recent one 10 years ago was negative.  He has no complaints related to his abdomen.  He is currently being worked up for CLL but that is being done by the hematologist and apparently there were no particular concerns at this time and no reason to postpone the colonoscopy.  Review of Systems  10 point review is otherwise negative  Objective   Physical Exam head is normocephalic atraumatic eyes extraocular movements are intact however the patient is blind.  Were almost completely blind.  His lungs are clear bilaterally heart is regular rate and rhythm.  Extremities do not reveal any gross deformities.    Assessment/Plan plan colonoscopy we discussed the risks including bleeding perforation and missed lesion and he agrees to proceed           Arlette Acuna MD 02/06/24 3:00 PM

## 2024-02-14 RX ORDER — AMLODIPINE BESYLATE 5 MG/1
5 TABLET ORAL
COMMUNITY
Start: 2020-10-05 | End: 2024-02-22 | Stop reason: ALTCHOICE

## 2024-02-15 ENCOUNTER — OFFICE VISIT (OUTPATIENT)
Dept: HEMATOLOGY/ONCOLOGY | Facility: CLINIC | Age: 74
End: 2024-02-15
Payer: MEDICARE

## 2024-02-15 VITALS
BODY MASS INDEX: 35.03 KG/M2 | HEART RATE: 69 BPM | SYSTOLIC BLOOD PRESSURE: 137 MMHG | HEIGHT: 71 IN | TEMPERATURE: 96.4 F | OXYGEN SATURATION: 95 % | DIASTOLIC BLOOD PRESSURE: 78 MMHG | WEIGHT: 250.22 LBS | RESPIRATION RATE: 16 BRPM

## 2024-02-15 DIAGNOSIS — C91.10 CLL (CHRONIC LYMPHOCYTIC LEUKEMIA) (MULTI): Primary | ICD-10-CM

## 2024-02-15 PROCEDURE — 3008F BODY MASS INDEX DOCD: CPT | Performed by: PHYSICIAN ASSISTANT

## 2024-02-15 PROCEDURE — 1126F AMNT PAIN NOTED NONE PRSNT: CPT | Performed by: PHYSICIAN ASSISTANT

## 2024-02-15 PROCEDURE — 99214 OFFICE O/P EST MOD 30 MIN: CPT | Performed by: PHYSICIAN ASSISTANT

## 2024-02-15 PROCEDURE — 3078F DIAST BP <80 MM HG: CPT | Performed by: PHYSICIAN ASSISTANT

## 2024-02-15 PROCEDURE — 1036F TOBACCO NON-USER: CPT | Performed by: PHYSICIAN ASSISTANT

## 2024-02-15 PROCEDURE — 3075F SYST BP GE 130 - 139MM HG: CPT | Performed by: PHYSICIAN ASSISTANT

## 2024-02-15 PROCEDURE — 1159F MED LIST DOCD IN RCRD: CPT | Performed by: PHYSICIAN ASSISTANT

## 2024-02-15 ASSESSMENT — PAIN SCALES - GENERAL: PAINLEVEL: 0-NO PAIN

## 2024-02-15 NOTE — PROGRESS NOTES
Patient Visit Information:   Visit Type: Benign Heme Follow-up     Cancer History:   Treatment Synopsis:    74 yo male referred by Dr. Acuna for leucocytosis.     Patient had cholecystectomy in 7/2023, noted to have elevated WBC with intermittent lymphocytosis. Normal Hgb and platelet count.     On assessment, has recovered well from surgery. Reports good energy and appetite. Denies F/C/night sweats, unintentional weight loss, anorexia, fatigue, HA, change in vision/hearing, palpitations, CP, SOB, n/v/d/c/abd pain, bleeding, melena, LAD, peripheral neuropathy, rash.     PMH/PSH: HTN, HL, hypothyroidism, BPH, retinitis pigmentosa 3 hernia repair, cholecystectomy   FH: Dad-prostate cancer, Mom with uterine cancer, maternal aunt with breast cancer.  Soc Hx: denies smoking, MADALYN, illicit drugs; retired maintenance, /EMS, .    History of Present Illness:   Patient presents for follow up with his wife. Patient recently had a ear infection after coming back from vacation required 2 rounds of antibiotics ow feeling better. Joined Gym and is working out 4x/week with his wife, reports feeling very well. Denies B symptoms, bleeding from any source or any other complaints at this time.    Review of Systems:    12 pt ROS was performed, pertinent positive and negative findings as per HPI above, remainder of systems reviewed and are negative.    Allergies and Intolerances:       Allergies:   amoxicillin: Drug, Unknown, Active   penicillin: Drug, Unknown, Active    Current Outpatient Medications on File Prior to Visit   Medication Sig Dispense Refill    aspirin 81 mg chewable tablet Chew once daily. CHEW AND SWALLOW 1 TABLET DAILY      levothyroxine (Synthroid, Levoxyl) 50 mcg tablet Take 1 tablet (50 mcg) by mouth once daily. 90 tablet 3    metoprolol succinate XL (Toprol-XL) 100 mg 24 hr tablet Take 1 tablet (100 mg) by mouth once daily.      rosuvastatin (Crestor) 10 mg tablet Take 1 tablet (10 mg) by mouth  "once daily. 90 tablet 3     No current facility-administered medications on file prior to visit.          Surg History:   S/P laparoscopic cholecystectomy: ICD-10: Z90.49, Status: Active       Social History:   Social Substance History:  · Smoking Status never smoker (1)     Visit Vitals  /78 (BP Location: Left arm, Patient Position: Sitting, BP Cuff Size: Large adult)   Pulse 69   Temp 35.8 °C (96.4 °F) (Temporal)   Resp 16   Ht 1.812 m (5' 11.34\")   Wt 113 kg (250 lb 3.6 oz)   SpO2 95%   BMI 34.57 kg/m²   Smoking Status Never   BSA 2.4 m²     Physical Exam:   Constitutional: Well developed, awake/alert/oriented x3, no distress, alert and cooperative   Eyes: clear sclera   Extremities: normal extremities, no cyanosis edema, contusions or wounds, no clubbing   Neurological: alert and oriented x3, intact senses, motor, response and reflexes, normal strength   Lymphatic: No significant lymphadenopathy   Psychological: Appropriate mood and behavior       Assessment:    74 yo male referred by Dr. Acuna for leucocytosis with lymphocytosis.    PB flow shows immunophenotype is characteristic of what may be seen in chronic lymphocytic leukemia (CLL). 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 Discussed continued monitoring of labs and retesting PB flow if any changes in counts and/or symptoms. Discussed role of BMBx    Plan:    Reviewed and discussed lab, imaging, and pathology results with patient in detail as well as diagnosis, prognosis, and treatment options.    Discussed continued monitoring pf labs with CBCd, CMP, LDH every 6 months    Discussed chronic elevated Bilirubin likely Gilbert syndrome; continue to monitor.    F/U w/PCP    RTC in 6 months     Patient verbalized understanding, and all his questions were answered to his satisfaction.     30 min spent with patient greater than 50 % of which was spent in " consultation, counselling and coordination of care.

## 2024-02-21 NOTE — PROGRESS NOTES
"Counseling:  The patient was counseled regarding diagnostic results, instructions for management, risk factor reductions, prognosis, patient and family education, impressions, risks and benefits of treatment options and importance of compliance with treatment.      Chief Complaint:   The patient presents today for annual followup of HTN.     History Of Present Illness:    Yadiel Solano is a 74 year old male patient who presents in the company of his wife for annual followup of HTN. His PMH is significant for hypothyroidism, CLL, HTN, BPH, obesity and hyperlipidemia. Over the past year, the patient states that he has done well from a cardiac standpoint. He denies any CP, chest discomfort or SOB. He is being followed at [facility] for management of CLL. Per the patient, chemotherapy has not been recommended as his case is \"mild.\" BP has been stable. He and his wife have joined a gym and attend 5-6 days/week with no reported difficulties. EKG today is stable with no acute changes. The patient is compliant with his prescribed medications.     Last Recorded Vitals:  Vitals:    02/22/24 1024   BP: 128/86   Pulse: 72   Weight: 113 kg (250 lb)   Height: 1.803 m (5' 11\")       Past Surgical History:  He has a past surgical history that includes Hernia repair (03/07/2016); Tonsillectomy (03/07/2016); and Cholecystectomy.      Social History:  He reports that he has never smoked. He has never used smokeless tobacco. He reports that he does not drink alcohol and does not use drugs.    Family History:  Family History   Problem Relation Name Age of Onset    Uterine cancer Mother Oriana     Hypertension Mother Oriana         Allergies:  Amoxicillin-pot clavulanate and Penicillins    Outpatient Medications:  Current Outpatient Medications   Medication Instructions    amLODIPine (NORVASC) 5 mg, oral    aspirin 81 mg chewable tablet oral, Daily RT, CHEW AND SWALLOW 1 TABLET DAILY    levothyroxine (SYNTHROID, LEVOXYL) 50 mcg, oral, Daily    " metoprolol succinate XL (Toprol-XL) 100 mg 24 hr tablet 1 tablet, oral, Daily    OMEGA-3 ACID ETHYL ESTERS ORAL oral    rosuvastatin (CRESTOR) 10 mg, oral, Daily    vitamins A,C,E-zinc-copper 4,296 mcg-226 mg-90 mg capsule oral     Review of Systems   All other systems reviewed and are negative.     Physical Exam:  Constitutional:       Appearance: Healthy appearance. Not in distress.   Neck:      Vascular: No JVR. JVD normal.   Pulmonary:      Effort: Pulmonary effort is normal.      Breath sounds: Normal breath sounds. No wheezing. No rhonchi. No rales.   Chest:      Chest wall: Not tender to palpatation.   Cardiovascular:      PMI at left midclavicular line. Normal rate. Regular rhythm. Normal S1. Normal S2.       Murmurs: There is no murmur.      No gallop.  No click. No rub.   Pulses:     Intact distal pulses.   Edema:     Peripheral edema absent.   Abdominal:      General: Bowel sounds are normal.      Palpations: Abdomen is soft.      Tenderness: There is no abdominal tenderness.   Musculoskeletal: Normal range of motion.         General: No tenderness. Skin:     General: Skin is warm and dry.   Neurological:      General: No focal deficit present.      Mental Status: Alert and oriented to person, place and time.          Last Labs:  CBC -  Lab Results   Component Value Date    WBC 9.5 02/05/2024    HGB 16.1 02/05/2024    HCT 48.3 02/05/2024    MCV 91 02/05/2024     (L) 02/05/2024       CMP -  Lab Results   Component Value Date    CALCIUM 9.0 02/05/2024    PROT 6.3 (L) 02/05/2024    ALBUMIN 4.2 02/05/2024    AST 26 02/05/2024    ALT 33 02/05/2024    ALKPHOS 38 02/05/2024    BILITOT 2.5 (H) 02/05/2024       LIPID PANEL -   Lab Results   Component Value Date    CHOL 108 02/22/2023    TRIG 180 (H) 02/22/2023    HDL 28.0 (A) 02/22/2023    CHHDL 3.9 02/22/2023    LDLF 44 02/22/2023    VLDL 36 02/22/2023    NHDL 114 09/23/2020       RENAL FUNCTION PANEL -   Lab Results   Component Value Date    GLUCOSE 103  (H) 02/05/2024     02/05/2024    K 4.3 02/05/2024     02/05/2024    CO2 18 (L) 02/05/2024    ANIONGAP 17 02/05/2024    BUN 13 02/05/2024    CREATININE 0.84 02/05/2024    GFRMALE >90 08/10/2023    CALCIUM 9.0 02/05/2024    ALBUMIN 4.2 02/05/2024        Last Cardiology Tests:  08/31/2017 - Stress Test  1. Abnormal graded exercise stress test secondary to EKG changes without chest pain. Above average exercise capacity. Woodall treadmill score is a 4, suggesting an intermediate risk for mortality.   2. Normal myocardial perfusion scan with well-preserved LV function after exercise stress.      06/13/2017 - Stress Test  Abnormal graded exercise stress test secondary to electrocardiogram changes without chest pain. Average exercise capacity. Woodall treadmill score is -2 suggesting an intermediate risk for mortality.      Lab review: I have personally reviewed the laboratory result(s).    Assessment/Plan   1) HTN  Continue amlodipine 5 mg daily, metoprolol succinate 100 mg daily  Stable  Denies CP, chest discomfort or SOB  EKG stable  F/U 1 year     2) CLL  Being followed at [facility]  Chemotherapy not currently being recommended       Scribe Attestation  By signing my name below, I, Larissa Toth   attest that this documentation has been prepared under the direction and in the presence of Abram Hutchins MD.

## 2024-02-22 ENCOUNTER — OFFICE VISIT (OUTPATIENT)
Dept: CARDIOLOGY | Facility: CLINIC | Age: 74
End: 2024-02-22
Payer: MEDICARE

## 2024-02-22 ENCOUNTER — APPOINTMENT (OUTPATIENT)
Dept: HEMATOLOGY/ONCOLOGY | Facility: CLINIC | Age: 74
End: 2024-02-22
Payer: COMMERCIAL

## 2024-02-22 VITALS
SYSTOLIC BLOOD PRESSURE: 128 MMHG | WEIGHT: 250 LBS | HEART RATE: 72 BPM | HEIGHT: 71 IN | BODY MASS INDEX: 35 KG/M2 | DIASTOLIC BLOOD PRESSURE: 86 MMHG

## 2024-02-22 DIAGNOSIS — I10 BENIGN ESSENTIAL HYPERTENSION: Primary | ICD-10-CM

## 2024-02-22 PROCEDURE — 1036F TOBACCO NON-USER: CPT | Performed by: INTERNAL MEDICINE

## 2024-02-22 PROCEDURE — 1160F RVW MEDS BY RX/DR IN RCRD: CPT | Performed by: INTERNAL MEDICINE

## 2024-02-22 PROCEDURE — 99213 OFFICE O/P EST LOW 20 MIN: CPT | Performed by: INTERNAL MEDICINE

## 2024-02-22 PROCEDURE — 3074F SYST BP LT 130 MM HG: CPT | Performed by: INTERNAL MEDICINE

## 2024-02-22 PROCEDURE — 1126F AMNT PAIN NOTED NONE PRSNT: CPT | Performed by: INTERNAL MEDICINE

## 2024-02-22 PROCEDURE — 1159F MED LIST DOCD IN RCRD: CPT | Performed by: INTERNAL MEDICINE

## 2024-02-22 PROCEDURE — 93000 ELECTROCARDIOGRAM COMPLETE: CPT | Performed by: INTERNAL MEDICINE

## 2024-02-22 PROCEDURE — 3008F BODY MASS INDEX DOCD: CPT | Performed by: INTERNAL MEDICINE

## 2024-02-22 PROCEDURE — 3079F DIAST BP 80-89 MM HG: CPT | Performed by: INTERNAL MEDICINE

## 2024-02-22 NOTE — PATIENT INSTRUCTIONS
Continue all current medications as prescribed.   Followup with Dr. Hutchins in 1 year, sooner should any issues or concerns arise before then.     If you have any questions or cardiac concerns, please call our office at [phone].

## 2024-02-26 ENCOUNTER — OFFICE VISIT (OUTPATIENT)
Dept: PRIMARY CARE | Facility: CLINIC | Age: 74
End: 2024-02-26
Payer: MEDICARE

## 2024-02-26 VITALS
SYSTOLIC BLOOD PRESSURE: 130 MMHG | HEIGHT: 72 IN | HEART RATE: 70 BPM | OXYGEN SATURATION: 98 % | WEIGHT: 255.6 LBS | DIASTOLIC BLOOD PRESSURE: 80 MMHG | RESPIRATION RATE: 16 BRPM | BODY MASS INDEX: 34.62 KG/M2

## 2024-02-26 DIAGNOSIS — I10 PRIMARY HYPERTENSION: ICD-10-CM

## 2024-02-26 DIAGNOSIS — R73.9 HYPERGLYCEMIA: ICD-10-CM

## 2024-02-26 DIAGNOSIS — I73.9 PERIPHERAL VASCULAR DISEASE, UNSPECIFIED (CMS-HCC): ICD-10-CM

## 2024-02-26 DIAGNOSIS — E03.9 ADULT HYPOTHYROIDISM: ICD-10-CM

## 2024-02-26 DIAGNOSIS — N40.0 BENIGN PROSTATIC HYPERPLASIA WITHOUT URINARY OBSTRUCTION: ICD-10-CM

## 2024-02-26 DIAGNOSIS — Z00.00 MEDICARE ANNUAL WELLNESS VISIT, SUBSEQUENT: ICD-10-CM

## 2024-02-26 DIAGNOSIS — Z00.00 ROUTINE GENERAL MEDICAL EXAMINATION AT HEALTH CARE FACILITY: Primary | ICD-10-CM

## 2024-02-26 DIAGNOSIS — E80.6 ACQUIRED HYPERBILIRUBINEMIA: ICD-10-CM

## 2024-02-26 DIAGNOSIS — E78.2 MIXED HYPERLIPIDEMIA: ICD-10-CM

## 2024-02-26 DIAGNOSIS — E66.01 OBESITY, MORBID (MULTI): ICD-10-CM

## 2024-02-26 PROCEDURE — 3079F DIAST BP 80-89 MM HG: CPT | Performed by: FAMILY MEDICINE

## 2024-02-26 PROCEDURE — 3008F BODY MASS INDEX DOCD: CPT | Performed by: FAMILY MEDICINE

## 2024-02-26 PROCEDURE — 3075F SYST BP GE 130 - 139MM HG: CPT | Performed by: FAMILY MEDICINE

## 2024-02-26 PROCEDURE — G0439 PPPS, SUBSEQ VISIT: HCPCS | Performed by: FAMILY MEDICINE

## 2024-02-26 PROCEDURE — 1126F AMNT PAIN NOTED NONE PRSNT: CPT | Performed by: FAMILY MEDICINE

## 2024-02-26 PROCEDURE — 1160F RVW MEDS BY RX/DR IN RCRD: CPT | Performed by: FAMILY MEDICINE

## 2024-02-26 PROCEDURE — 1170F FXNL STATUS ASSESSED: CPT | Performed by: FAMILY MEDICINE

## 2024-02-26 PROCEDURE — 1158F ADVNC CARE PLAN TLK DOCD: CPT | Performed by: FAMILY MEDICINE

## 2024-02-26 PROCEDURE — 1159F MED LIST DOCD IN RCRD: CPT | Performed by: FAMILY MEDICINE

## 2024-02-26 PROCEDURE — 1123F ACP DISCUSS/DSCN MKR DOCD: CPT | Performed by: FAMILY MEDICINE

## 2024-02-26 PROCEDURE — 1036F TOBACCO NON-USER: CPT | Performed by: FAMILY MEDICINE

## 2024-02-26 RX ORDER — LEVOTHYROXINE SODIUM 50 UG/1
50 TABLET ORAL DAILY
Qty: 90 TABLET | Refills: 3 | Status: SHIPPED | OUTPATIENT
Start: 2024-02-26 | End: 2025-02-25

## 2024-02-26 RX ORDER — METOPROLOL SUCCINATE 100 MG/1
100 TABLET, EXTENDED RELEASE ORAL DAILY
Qty: 90 TABLET | Refills: 3 | Status: SHIPPED | OUTPATIENT
Start: 2024-02-26 | End: 2025-02-25

## 2024-02-26 RX ORDER — ROSUVASTATIN CALCIUM 10 MG/1
10 TABLET, COATED ORAL DAILY
Qty: 90 TABLET | Refills: 3 | Status: SHIPPED | OUTPATIENT
Start: 2024-02-26 | End: 2025-02-25

## 2024-02-26 RX ORDER — ROSUVASTATIN CALCIUM 10 MG/1
10 TABLET, COATED ORAL DAILY
Qty: 90 TABLET | Refills: 3 | Status: SHIPPED | OUTPATIENT
Start: 2024-02-26 | End: 2024-02-26 | Stop reason: SDUPTHER

## 2024-02-26 RX ORDER — LEVOTHYROXINE SODIUM 50 UG/1
50 TABLET ORAL DAILY
Qty: 90 TABLET | Refills: 3 | Status: SHIPPED | OUTPATIENT
Start: 2024-02-26 | End: 2024-02-26 | Stop reason: SDUPTHER

## 2024-02-26 RX ORDER — METOPROLOL SUCCINATE 100 MG/1
100 TABLET, EXTENDED RELEASE ORAL DAILY
Qty: 90 TABLET | Refills: 3 | Status: SHIPPED | OUTPATIENT
Start: 2024-02-26 | End: 2024-02-26 | Stop reason: SDUPTHER

## 2024-02-26 ASSESSMENT — COLUMBIA-SUICIDE SEVERITY RATING SCALE - C-SSRS
1. IN THE PAST MONTH, HAVE YOU WISHED YOU WERE DEAD OR WISHED YOU COULD GO TO SLEEP AND NOT WAKE UP?: NO
6. HAVE YOU EVER DONE ANYTHING, STARTED TO DO ANYTHING, OR PREPARED TO DO ANYTHING TO END YOUR LIFE?: NO
2. HAVE YOU ACTUALLY HAD ANY THOUGHTS OF KILLING YOURSELF?: NO

## 2024-02-26 ASSESSMENT — ACTIVITIES OF DAILY LIVING (ADL)
TAKING_MEDICATION: INDEPENDENT
MANAGING_FINANCES: INDEPENDENT
DRESSING: INDEPENDENT
GROCERY_SHOPPING: INDEPENDENT
DOING_HOUSEWORK: INDEPENDENT
BATHING: INDEPENDENT

## 2024-02-26 ASSESSMENT — PATIENT HEALTH QUESTIONNAIRE - PHQ9
SUM OF ALL RESPONSES TO PHQ9 QUESTIONS 1 AND 2: 0
2. FEELING DOWN, DEPRESSED OR HOPELESS: NOT AT ALL
1. LITTLE INTEREST OR PLEASURE IN DOING THINGS: NOT AT ALL

## 2024-02-26 ASSESSMENT — ENCOUNTER SYMPTOMS
DEPRESSION: 0
OCCASIONAL FEELINGS OF UNSTEADINESS: 0
LOSS OF SENSATION IN FEET: 0

## 2024-02-26 NOTE — PROGRESS NOTES
"Subjective   Reason for Visit: Yadiel Solano is an 74 y.o. male here for a Medicare Wellness visit.   Since last visit he has had surgery, and was found to have chronic lymphocytic leukemia. They are just wathcing it for now, no treatment. Will have checked every six months.      Reviewed all medications by prescribing practitioner or clinical pharmacist (such as prescriptions, OTCs, herbal therapies and supplements) and documented in the medical record.    HPI  Weight stable, but going to gym. He feels great.   Patient Care Team:  Jennifer Willis MD as PCP - General     Review of Systems   All other systems reviewed and are negative.  Blind    Objective   Vitals:  /80   Pulse 70   Resp 16   Ht 1.816 m (5' 11.5\")   Wt 116 kg (255 lb 9.6 oz)   SpO2 98%   BMI 35.15 kg/m²       Physical Exam  Vitals reviewed.   Constitutional:       Appearance: Normal appearance.   HENT:      Head: Normocephalic and atraumatic.      Right Ear: Tympanic membrane normal.      Left Ear: Tympanic membrane normal.      Nose: Nose normal.      Mouth/Throat:      Mouth: Mucous membranes are moist.      Pharynx: Oropharynx is clear.   Eyes:      Extraocular Movements: Extraocular movements intact.      Conjunctiva/sclera: Conjunctivae normal.      Pupils: Pupils are equal, round, and reactive to light.   Cardiovascular:      Rate and Rhythm: Normal rate and regular rhythm.      Pulses: Normal pulses.      Heart sounds: Normal heart sounds.   Pulmonary:      Effort: Pulmonary effort is normal.      Breath sounds: Normal breath sounds.   Abdominal:      General: Abdomen is flat. Bowel sounds are normal.      Palpations: Abdomen is soft.   Musculoskeletal:         General: Normal range of motion.      Cervical back: Normal range of motion and neck supple.   Skin:     General: Skin is warm and dry.      Capillary Refill: Capillary refill takes less than 2 seconds.   Neurological:      General: No focal deficit present.      Mental " Status: He is alert and oriented to person, place, and time.   Psychiatric:         Mood and Affect: Mood normal.         Behavior: Behavior normal.       Assessment/Plan   Problem List Items Addressed This Visit       Adult hypothyroidism    Relevant Medications    levothyroxine (Synthroid, Levoxyl) 50 mcg tablet    Other Relevant Orders    TSH with reflex to Free T4 if abnormal    Benign prostatic hyperplasia without urinary obstruction    Relevant Orders    Prostate Specific Antigen    Hyperlipidemia    Relevant Orders    Lipid Panel    Acquired hyperbilirubinemia    Relevant Medications    rosuvastatin (Crestor) 10 mg tablet    Obesity, morbid (CMS/[facility])    Hyperglycemia    Relevant Orders    Hemoglobin A1C    Medicare annual wellness visit, subsequent - Primary     Other Visit Diagnoses       Peripheral vascular disease, unspecified (CMS/[facility])        Primary hypertension        Relevant Medications    metoprolol succinate XL (Toprol-XL) 100 mg 24 hr tablet          Patient is doing well, headed for [place] 2/28/2024. Follow up annually, call if you need anything,

## 2024-02-26 NOTE — PATIENT INSTRUCTIONS
Problem List Items Addressed This Visit       Adult hypothyroidism    Relevant Medications    levothyroxine (Synthroid, Levoxyl) 50 mcg tablet    Other Relevant Orders    TSH with reflex to Free T4 if abnormal    Benign prostatic hyperplasia without urinary obstruction    Relevant Orders    Prostate Specific Antigen    Hyperlipidemia    Relevant Orders    Lipid Panel    Acquired hyperbilirubinemia    Relevant Medications    rosuvastatin (Crestor) 10 mg tablet    Obesity, morbid (CMS/[facility])    Hyperglycemia    Relevant Orders    Hemoglobin A1C    Medicare annual wellness visit, subsequent - Primary     Other Visit Diagnoses       Peripheral vascular disease, unspecified (CMS/[facility])        Primary hypertension        Relevant Medications    metoprolol succinate XL (Toprol-XL) 100 mg 24 hr tablet

## 2024-03-27 ENCOUNTER — ANESTHESIA EVENT (OUTPATIENT)
Dept: OPERATING ROOM | Facility: HOSPITAL | Age: 74
End: 2024-03-27
Payer: MEDICARE

## 2024-03-29 ENCOUNTER — HOSPITAL ENCOUNTER (OUTPATIENT)
Dept: OPERATING ROOM | Facility: HOSPITAL | Age: 74
Discharge: HOME | End: 2024-03-29
Payer: MEDICARE

## 2024-03-29 ENCOUNTER — ANESTHESIA (OUTPATIENT)
Dept: OPERATING ROOM | Facility: HOSPITAL | Age: 74
End: 2024-03-29
Payer: MEDICARE

## 2024-03-29 VITALS
HEIGHT: 71 IN | TEMPERATURE: 97 F | DIASTOLIC BLOOD PRESSURE: 77 MMHG | HEART RATE: 60 BPM | SYSTOLIC BLOOD PRESSURE: 111 MMHG | RESPIRATION RATE: 16 BRPM | BODY MASS INDEX: 34.32 KG/M2 | OXYGEN SATURATION: 96 % | WEIGHT: 245.15 LBS

## 2024-03-29 DIAGNOSIS — Z12.11 COLON CANCER SCREENING: ICD-10-CM

## 2024-03-29 PROCEDURE — 2500000005 HC RX 250 GENERAL PHARMACY W/O HCPCS: Performed by: NURSE ANESTHETIST, CERTIFIED REGISTERED

## 2024-03-29 PROCEDURE — 2500000004 HC RX 250 GENERAL PHARMACY W/ HCPCS (ALT 636 FOR OP/ED): Performed by: ANESTHESIOLOGY

## 2024-03-29 PROCEDURE — 3600000002 HC OR TIME - INITIAL BASE CHARGE - PROCEDURE LEVEL TWO: Performed by: NURSE ANESTHETIST, CERTIFIED REGISTERED

## 2024-03-29 PROCEDURE — 7100000010 HC PHASE TWO TIME - EACH INCREMENTAL 1 MINUTE: Performed by: NURSE ANESTHETIST, CERTIFIED REGISTERED

## 2024-03-29 PROCEDURE — A45378 PR COLONOSCOPY,DIAGNOSTIC: Performed by: NURSE ANESTHETIST, CERTIFIED REGISTERED

## 2024-03-29 PROCEDURE — 45380 COLONOSCOPY AND BIOPSY: CPT | Performed by: SURGERY

## 2024-03-29 PROCEDURE — 88305 TISSUE EXAM BY PATHOLOGIST: CPT | Performed by: STUDENT IN AN ORGANIZED HEALTH CARE EDUCATION/TRAINING PROGRAM

## 2024-03-29 PROCEDURE — 3700000001 HC GENERAL ANESTHESIA TIME - INITIAL BASE CHARGE: Performed by: NURSE ANESTHETIST, CERTIFIED REGISTERED

## 2024-03-29 PROCEDURE — 3700000002 HC GENERAL ANESTHESIA TIME - EACH INCREMENTAL 1 MINUTE: Performed by: NURSE ANESTHETIST, CERTIFIED REGISTERED

## 2024-03-29 PROCEDURE — 2500000004 HC RX 250 GENERAL PHARMACY W/ HCPCS (ALT 636 FOR OP/ED): Performed by: NURSE ANESTHETIST, CERTIFIED REGISTERED

## 2024-03-29 PROCEDURE — 7100000009 HC PHASE TWO TIME - INITIAL BASE CHARGE: Performed by: NURSE ANESTHETIST, CERTIFIED REGISTERED

## 2024-03-29 PROCEDURE — 88305 TISSUE EXAM BY PATHOLOGIST: CPT | Mod: TC | Performed by: SURGERY

## 2024-03-29 PROCEDURE — 3600000007 HC OR TIME - EACH INCREMENTAL 1 MINUTE - PROCEDURE LEVEL TWO: Performed by: NURSE ANESTHETIST, CERTIFIED REGISTERED

## 2024-03-29 RX ORDER — PROPOFOL 10 MG/ML
INJECTION, EMULSION INTRAVENOUS AS NEEDED
Status: DISCONTINUED | OUTPATIENT
Start: 2024-03-29 | End: 2024-03-29

## 2024-03-29 RX ORDER — LIDOCAINE HYDROCHLORIDE 10 MG/ML
INJECTION, SOLUTION EPIDURAL; INFILTRATION; INTRACAUDAL; PERINEURAL AS NEEDED
Status: DISCONTINUED | OUTPATIENT
Start: 2024-03-29 | End: 2024-03-29

## 2024-03-29 RX ORDER — SODIUM CHLORIDE, SODIUM LACTATE, POTASSIUM CHLORIDE, CALCIUM CHLORIDE 600; 310; 30; 20 MG/100ML; MG/100ML; MG/100ML; MG/100ML
100 INJECTION, SOLUTION INTRAVENOUS CONTINUOUS
Status: DISCONTINUED | OUTPATIENT
Start: 2024-03-29 | End: 2024-03-30 | Stop reason: HOSPADM

## 2024-03-29 RX ORDER — LIDOCAINE HYDROCHLORIDE 10 MG/ML
0.1 INJECTION, SOLUTION EPIDURAL; INFILTRATION; INTRACAUDAL; PERINEURAL ONCE
Status: DISCONTINUED | OUTPATIENT
Start: 2024-03-29 | End: 2024-03-30 | Stop reason: HOSPADM

## 2024-03-29 RX ORDER — ONDANSETRON HYDROCHLORIDE 2 MG/ML
4 INJECTION, SOLUTION INTRAVENOUS ONCE AS NEEDED
Status: DISCONTINUED | OUTPATIENT
Start: 2024-03-29 | End: 2024-03-30 | Stop reason: HOSPADM

## 2024-03-29 RX ORDER — MIDAZOLAM HYDROCHLORIDE 1 MG/ML
INJECTION INTRAMUSCULAR; INTRAVENOUS AS NEEDED
Status: DISCONTINUED | OUTPATIENT
Start: 2024-03-29 | End: 2024-03-29

## 2024-03-29 RX ORDER — DROPERIDOL 2.5 MG/ML
0.62 INJECTION, SOLUTION INTRAMUSCULAR; INTRAVENOUS ONCE AS NEEDED
Status: DISCONTINUED | OUTPATIENT
Start: 2024-03-29 | End: 2024-03-30 | Stop reason: HOSPADM

## 2024-03-29 RX ADMIN — LIDOCAINE HYDROCHLORIDE 20 MG: 10 INJECTION, SOLUTION EPIDURAL; INFILTRATION; INTRACAUDAL; PERINEURAL at 10:18

## 2024-03-29 RX ADMIN — MIDAZOLAM HYDROCHLORIDE 2 MG: 1 INJECTION, SOLUTION INTRAMUSCULAR; INTRAVENOUS at 10:12

## 2024-03-29 RX ADMIN — SODIUM CHLORIDE, POTASSIUM CHLORIDE, SODIUM LACTATE AND CALCIUM CHLORIDE 100 ML/HR: 600; 310; 30; 20 INJECTION, SOLUTION INTRAVENOUS at 09:43

## 2024-03-29 RX ADMIN — PROPOFOL 140 MCG/KG/MIN: 10 INJECTION, EMULSION INTRAVENOUS at 10:18

## 2024-03-29 RX ADMIN — PROPOFOL 50 MG: 10 INJECTION, EMULSION INTRAVENOUS at 10:18

## 2024-03-29 RX ADMIN — SODIUM CHLORIDE, SODIUM LACTATE, POTASSIUM CHLORIDE, AND CALCIUM CHLORIDE: 600; 310; 30; 20 INJECTION, SOLUTION INTRAVENOUS at 10:10

## 2024-03-29 ASSESSMENT — PAIN - FUNCTIONAL ASSESSMENT
PAIN_FUNCTIONAL_ASSESSMENT: 0-10
PAIN_FUNCTIONAL_ASSESSMENT: 0-10

## 2024-03-29 ASSESSMENT — PAIN SCALES - GENERAL
PAINLEVEL_OUTOF10: 0 - NO PAIN
PAINLEVEL_OUTOF10: 0 - NO PAIN

## 2024-03-29 NOTE — ANESTHESIA PREPROCEDURE EVALUATION
Patient: Yadiel Solano    Procedure Information       Date/Time: 03/29/24 1000    Scheduled providers: Arlette Acuna MD    Procedure: COLONOSCOPY    Location: [facility] OR            Relevant Problems   Cardiac   (+) Benign essential hypertension   (+) Chest pain   (+) Hyperlipidemia   (+) PVD (peripheral vascular disease) (CMS/HCC)      /Renal   (+) Benign prostatic hyperplasia without urinary obstruction      Endocrine   (+) Adult hypothyroidism   (+) Class 2 severe obesity due to excess calories with serious comorbidity and body mass index (BMI) of 35.0 to 35.9 in adult (CMS/HCC)   (+) Obesity, morbid (CMS/HCC)      HEENT   (+) Sinobronchitis      ID   (+) Mycotic toenails   + CLL -- monitoring     Clinical information reviewed:    Allergies  Meds             Vitals:    03/29/24 0902   BP: 159/76   Pulse: 67   Resp: 16   Temp: 36.3 °C (97.3 °F)   SpO2: 95%       Past Surgical History:   Procedure Laterality Date    CHOLECYSTECTOMY      HERNIA REPAIR  03/07/2016    Inguinal Hernia Repair    TONSILLECTOMY  03/07/2016    Tonsillectomy     Past Medical History:   Diagnosis Date    Class 2 obesity with body mass index (BMI) of 35.0 to 35.9 in adult 02/15/2024    Colon cancer screening     3/29/2024    Colon polyp ?    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 CHEW AND SWALLOW 1 TABLET DAILY, Disp: , Rfl:     levothyroxine (Synthroid, Levoxyl) 50 mcg tablet, Take 1 tablet (50 mcg) by mouth once daily., Disp: 90 tablet, Rfl: 3    metoprolol succinate XL (Toprol-XL) 100 mg 24 hr tablet, Take 1 tablet (100 mg) by mouth once daily., Disp: 90 tablet, Rfl: 3    rosuvastatin (Crestor) 10 mg tablet, Take 1 tablet (10 mg) by mouth once daily., Disp: 90 tablet, Rfl: 3    Current Facility-Administered Medications:     lactated Ringer's infusion, 100 mL/hr, intravenous, Continuous, Pedro Alfaro MD, Last Rate: 100 mL/hr at 03/29/24 0943, 100 mL/hr at 03/29/24 0943  Prior to Admission medications    Medication Sig Start Date End Date Taking? Authorizing Provider   aspirin 81 mg chewable tablet Chew once daily. CHEW AND SWALLOW 1 TABLET DAILY 6/6/19  Yes Historical Provider, MD   levothyroxine (Synthroid, Levoxyl) 50 mcg tablet Take 1 tablet (50 mcg) by mouth once daily. 2/26/24 2/25/25 Yes Jennifer Willis MD   metoprolol succinate XL (Toprol-XL) 100 mg 24 hr tablet Take 1 tablet (100 mg) by mouth once daily. 2/26/24 2/25/25 Yes Jennifer Willis MD   rosuvastatin (Crestor) 10 mg tablet Take 1 tablet (10 mg) by mouth once daily. 2/26/24 2/25/25 Yes Jennifer Willis MD     Allergies   Allergen Reactions    Amoxicillin-Pot Clavulanate Unknown    Penicillins Unknown     Social History     Tobacco Use    Smoking status: Never    Smokeless tobacco: Never   Substance Use Topics    Alcohol use: Never         Chemistry    Lab Results   Component Value Date/Time     02/05/2024 1114    K 4.3 02/05/2024 1114     02/05/2024 1114    CO2 18 (L) 02/05/2024 1114    BUN 13 02/05/2024 1114    CREATININE 0.84 02/05/2024 1114    Lab Results   Component Value Date/Time    CALCIUM 9.0 02/05/2024 1114    ALKPHOS 38 02/05/2024 1114    AST 26 02/05/2024 1114    ALT 33 02/05/2024 1114    BILITOT 2.5 (H) 02/05/2024 1114          Lab Results   Component Value  "Date/Time    WBC 9.5 02/05/2024 1114    HGB 16.1 02/05/2024 1114    HCT 48.3 02/05/2024 1114     (L) 02/05/2024 1114     No results found for: \"PROTIME\", \"PTT\", \"INR\"  Encounter Date: 02/22/24   ECG 12 lead (Clinic Performed)    Narrative    See scanned image      No results found for this or any previous visit from the past 1095 days.    NPO Detail:  NPO/Void Status  Date of Last Liquid: 03/29/24  Time of Last Liquid: 0500  Date of Last Solid: 03/28/24         Physical Exam    Airway  Mallampati: II  TM distance: >3 FB  Neck ROM: full     Cardiovascular   Rhythm: regular  Rate: normal     Dental    Pulmonary - normal exam     Abdominal            Anesthesia Plan    History of general anesthesia?: yes  History of complications of general anesthesia?: no    ASA 3     MAC     intravenous induction   Anesthetic plan and risks discussed with patient.  Use of blood products discussed with patient who.    Plan discussed with CRNA and attending.      "

## 2024-03-29 NOTE — DISCHARGE INSTRUCTIONS
Patient Instructions after a Colonoscopy      The anesthetics, sedatives or narcotics which were given to you today will be acting in your body for the next 24 hours, so you might feel a little sleepy or groggy.  This feeling should slowly wear off. Carefully read and follow the instructions.     You received sedation today:  - Do not drive or operate any machinery or power tools of any kind.   - No alcoholic beverages today, not even beer or wine.  - Do not make any important decisions or sign any legal documents.  - No over the counter medications that contain alcohol or that may cause drowsiness.  - Do not make any important decisions or sign any legal documents.    While it is common to experience mild to moderate abdominal distention, gas, or belching after your procedure, if any of these symptoms occur following discharge from the GI Lab or within one week of having your procedure, call the [facility] to be advised whether a visit to your nearest Urgent Care or Emergency Department is indicated.  Take this paper with you if you go.     - If you develop an allergic reaction to the medications that were given during your procedure such as difficulty breathing, rash, hives, severe nausea, vomiting or lightheadedness.  - If you experience chest pain, shortness of breath, severe abdominal pain, fevers and chills.  -If you develop signs and symptoms of bleeding such as blood in your spit, if your stools turn black, tarry, or bloody  - If you have not urinated within 8 hours following your procedure.  - If your IV site becomes painful, red, inflamed, or looks infected.    If you received a biopsy/polypectomy/sphincterotomy the following instructions apply below:    __ Do not use Aspirin containing products, non-steroidal medications or anti-coagulants for one week following your procedure. (Examples of these types of medications are: Advil, Arthrotec, Aleve, Coumadin, Ecotrin, Heparin, Ibuprofen,  Indocin, Motrin, Naprosyn, Nuprin, Plavix, Vioxx, and Voltarin, or their generic forms.  This list is not all-inclusive.  Check with your physician or pharmacist before resuming medications.)   __ Eat a soft diet today.  Avoid foods that are poorly digested for the next 24 hours.  These foods would include: nuts, beans, lettuce, red meats, and fried foods. Start with liquids and advance your diet as tolerated, gradually work up to eating solids.   __ Do not have a Barium Study or Enema for one week.    Your physician recommends the additional following instructions:    -You have a contact number available for emergencies. The signs and symptoms of potential delayed complications were discussed with you. You may return to normal activities tomorrow.  -Resume your previous diet.  -Continue your present medications.   -We are waiting for your pathology results.  -Your physician has recommended a repeat colonoscopy (date to be determined after pending pathology results are reviewed) for surveillance based on pathology results.  -The findings and recommendations have been discussed with you.  -The findings and recommendations were discussed with your family.  - Please see Medication Reconciliation Form for new medication/medications prescribed.       If you experience any problems or have any questions following discharge from the GI Lab, please call:        Nurse Signature                                                                        Date___________________                                                                            Patient/Responsible Party Signature                                        Date___________________

## 2024-03-29 NOTE — H&P
"History Of Present Illness  Yadiel Solano is a 74 y.o. male presenting with screen for co9lon  .     Past Medical History  Past Medical History:   Diagnosis Date    Class 2 obesity with body mass index (BMI) of 35.0 to 35.9 in adult 02/15/2024    Colon cancer screening     3/29/2024    Colon polyp ?    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 He has never used smokeless tobacco. He reports that he does not drink alcohol and does not use drugs.    Family History  Family History   Problem Relation Name Age of Onset    Uterine cancer Mother Oriana     Hypertension Mother Oriana         Allergies  Amoxicillin-pot clavulanate and Penicillins    Review of Systems     Physical Exam   Lungs cloear  Heart RRR  Last Recorded Vitals  Blood pressure 159/76, pulse 67, temperature 36.3 °C (97.3 °F), resp. rate 16, height 1.803 m (5' 11\"), weight 111 kg (245 lb 2.4 oz), SpO2 95 %.    Relevant Results             Assessment/Plan   Active Problems:  There are no active Hospital Problems.      colon       I spent 10 minutes in the professional and overall care of this patient.      Arlette Acuna MD    "

## 2024-03-29 NOTE — ANESTHESIA POSTPROCEDURE EVALUATION
Patient: Yadiel Solano    Procedure Summary       Date: 03/29/24 Room / Location: [facility] OR    Anesthesia Start: 1010 Anesthesia Stop: 1043    Procedure: COLONOSCOPY Diagnosis: Colon cancer screening    Scheduled Providers: Arlette Acuna MD Responsible Provider: DAVID Dsouza    Anesthesia Type: MAC ASA Status: 3            Anesthesia Type: MAC    Vitals Value Taken Time   /77 03/29/24 1053   Temp 36.1 °C (97 °F) 03/29/24 1038   Pulse 60 03/29/24 1053   Resp 16 03/29/24 1053   SpO2 96 % 03/29/24 1053       Anesthesia Post Evaluation    Patient location during evaluation: PACU  Patient participation: complete - patient participated  Level of consciousness: awake and alert  Pain management: adequate  Airway patency: patent  Cardiovascular status: acceptable  Respiratory status: acceptable  Hydration status: acceptable  Postoperative Nausea and Vomiting: none        No notable events documented.

## 2024-04-05 LAB
LABORATORY COMMENT REPORT: NORMAL
PATH REPORT.FINAL DX SPEC: NORMAL
PATH REPORT.GROSS SPEC: NORMAL
PATH REPORT.TOTAL CANCER: NORMAL

## 2024-05-06 NOTE — OP NOTE
PREOPERATIVE DIAGNOSIS:  Cholecystitis, cholelithiasis.    POSTOPERATIVE DIAGNOSIS:  Cholecystitis, cholelithiasis.    OPERATION/PROCEDURE:  Laparoscopic cholecystectomy and transversus abdominis plane block.    SURGEON:  Arlette Acuna MD.    ASSISTANT(S):    ANESTHESIA:  General.    ESTIMATED BLOOD LOSS:  Minimal.    FLUIDS:  Crystalloids.    INDICATIONS:  The patient is a very pleasant gentleman, who has had some right  upper quadrant pain, definitely has no gallstones.  The procedure was  discussed with the patient, and he was eager to proceed.     DESCRIPTION OF PROCEDURE:  The patient was brought to the operating theater and placed on the  operating table in supine position.  After sufficient general  anesthesia was administered, the patient's abdomen was prepped and  draped in the usual sterile manner.  The patient has had previous  midline abdominal surgery around the umbilicus. Therefore, Lopez  point was approached.  A small incision was made here.  The abdominal  wall was lifted up.  A Veress needle was inserted.  A water drop test  confirmed placement into the peritoneal cavity.  CO2 pneumoperitoneum  was slowly achieved.  We then placed a trocar followed by the  laparoscope.  The abdomen was examined.  There was no injury to bowel  or blood vessel.  We then placed the additional ports under direct  vision.  The patient was positioned.  We were then able to lift up on  the gallbladder.  The infundibulum area and the cystic duct  gallbladder junction was cleared of the surrounding peritoneum.  What  we felt as most likely the cystic duct was cleared.  We also  identified an additional tubular structure, and when we applied the  spy camera, the structure that we thought was the cystic duct was  indeed the duct and the other structure was the artery.  These were  sufficiently cleared.  We were able to see not only that anatomy, but  also identify the common bile duct and the cystic duct-common  bile  duct junction with the ICG.  We then clipped the cystic duct against  the gallbladder side and then used a Caprosyn suture to tie an  extracorporeal knot on the cystic duct proximally.  Two additional  clips were placed.  The cystic duct was divided.  The artery was  clipped twice proximally and then LigaSure used distally.  The  attachments to the liver were then divided using the hook monopolar  cautery.  It was placed into an EndoCatch.  A transversus abdominis  plane block with 0.5% Marcaine was performed on both sides.  The  abdomen was inspected.  There was no leakage of bile.  No bleeding.  The ports were removed under direct vision.  The CO2 was allowed to  escape.  The gallbladder was retrieved.  The single 10 mm port site  was closed using 0 Vicryl in a figure-of-eight.  The skin incisions  were all closed using 4-0 Vicryl subcuticular sutures.  Dermabond was  applied as a dressing.  He tolerated this well and was returned to  the recovery area in good condition.       Arlette Acuna MD    DD:  07/18/2023 14:34:28 EST  DT:  07/18/2023 19:04:34 EST  DICTATION NUMBER:  [id number]  INTERNAL JOB NUMBER:  [id number]    CC:  MD ENRIQUE Purvis        Electronic Signatures:  Arlette Acuna) (Signed on 19-Jul-2023 07:11)   Authored  Unsigned, Draft (SYS GENERATED) (Entered on 18-Jul-2023 19:04)   Entered    Last Updated: 19-Jul-2023 07:11 by Arlette Acuna)

## 2024-06-03 ENCOUNTER — TELEPHONE (OUTPATIENT)
Dept: HEMATOLOGY/ONCOLOGY | Facility: CLINIC | Age: 74
End: 2024-06-03
Payer: MEDICARE

## 2024-06-03 NOTE — TELEPHONE ENCOUNTER
Advised to pt that he reach out to his PCP to evaluated, alerted Sandy Esteves to ensure there was nothing further needed.

## 2024-06-17 ENCOUNTER — TELEPHONE (OUTPATIENT)
Dept: PRIMARY CARE | Facility: CLINIC | Age: 74
End: 2024-06-17
Payer: MEDICARE

## 2024-06-17 DIAGNOSIS — H54.8 LEGALLY BLIND: Primary | ICD-10-CM

## 2024-06-17 DIAGNOSIS — L60.2 NAIL DISORDER (ONYCHOGRYPHOSIS): ICD-10-CM

## 2024-06-17 NOTE — TELEPHONE ENCOUNTER
Patient would like a referral to a podiatrist for some toe nails    Dr. Mcbride [facility] the fax number is [phone]

## 2024-06-25 DIAGNOSIS — L60.2 NAIL DISORDER (ONYCHOGRYPHOSIS): Primary | ICD-10-CM

## 2024-08-22 ENCOUNTER — APPOINTMENT (OUTPATIENT)
Dept: HEMATOLOGY/ONCOLOGY | Facility: CLINIC | Age: 74
End: 2024-08-22
Payer: MEDICARE

## 2024-08-28 ENCOUNTER — OFFICE VISIT (OUTPATIENT)
Dept: HEMATOLOGY/ONCOLOGY | Facility: CLINIC | Age: 74
End: 2024-08-28
Payer: MEDICARE

## 2024-08-28 VITALS
TEMPERATURE: 97.3 F | OXYGEN SATURATION: 95 % | RESPIRATION RATE: 17 BRPM | DIASTOLIC BLOOD PRESSURE: 80 MMHG | WEIGHT: 251.54 LBS | HEART RATE: 72 BPM | SYSTOLIC BLOOD PRESSURE: 119 MMHG | BODY MASS INDEX: 35.08 KG/M2

## 2024-08-28 DIAGNOSIS — D72.820 MONOCLONAL B-CELL LYMPHOCYTOSIS: Primary | ICD-10-CM

## 2024-08-28 DIAGNOSIS — C91.10 CLL (CHRONIC LYMPHOCYTIC LEUKEMIA) (MULTI): ICD-10-CM

## 2024-08-28 DIAGNOSIS — N40.0 BENIGN PROSTATIC HYPERPLASIA WITH ELEVATED PROSTATE SPECIFIC ANTIGEN (PSA): ICD-10-CM

## 2024-08-28 DIAGNOSIS — R97.20 BENIGN PROSTATIC HYPERPLASIA WITH ELEVATED PROSTATE SPECIFIC ANTIGEN (PSA): ICD-10-CM

## 2024-08-28 LAB
ALBUMIN SERPL BCP-MCNC: 4.2 G/DL (ref 3.4–5)
ALP SERPL-CCNC: 40 U/L (ref 33–136)
ALT SERPL W P-5'-P-CCNC: 51 U/L (ref 10–52)
ANION GAP SERPL CALC-SCNC: 11 MMOL/L (ref 10–20)
AST SERPL W P-5'-P-CCNC: 31 U/L (ref 9–39)
BASOPHILS # BLD AUTO: 0.03 X10*3/UL (ref 0–0.1)
BASOPHILS NFR BLD AUTO: 0.3 %
BILIRUB SERPL-MCNC: 2.2 MG/DL (ref 0–1.2)
BUN SERPL-MCNC: 13 MG/DL (ref 6–23)
CALCIUM SERPL-MCNC: 9.2 MG/DL (ref 8.6–10.3)
CHLORIDE SERPL-SCNC: 101 MMOL/L (ref 98–107)
CO2 SERPL-SCNC: 31 MMOL/L (ref 21–32)
CREAT SERPL-MCNC: 1.22 MG/DL (ref 0.5–1.3)
EGFRCR SERPLBLD CKD-EPI 2021: 63 ML/MIN/1.73M*2
EOSINOPHIL # BLD AUTO: 0.21 X10*3/UL (ref 0–0.4)
EOSINOPHIL NFR BLD AUTO: 1.9 %
ERYTHROCYTE [DISTWIDTH] IN BLOOD BY AUTOMATED COUNT: 13.8 % (ref 11.5–14.5)
GLUCOSE SERPL-MCNC: 110 MG/DL (ref 74–99)
HCT VFR BLD AUTO: 49.6 % (ref 41–52)
HGB BLD-MCNC: 16.1 G/DL (ref 13.5–17.5)
IMM GRANULOCYTES # BLD AUTO: 0.01 X10*3/UL (ref 0–0.5)
IMM GRANULOCYTES NFR BLD AUTO: 0.1 % (ref 0–0.9)
LDH SERPL L TO P-CCNC: 157 U/L (ref 84–246)
LYMPHOCYTES # BLD AUTO: 6.67 X10*3/UL (ref 0.8–3)
LYMPHOCYTES NFR BLD AUTO: 61.5 %
MCH RBC QN AUTO: 29.8 PG (ref 26–34)
MCHC RBC AUTO-ENTMCNC: 32.5 G/DL (ref 32–36)
MCV RBC AUTO: 92 FL (ref 80–100)
MONOCYTES # BLD AUTO: 0.61 X10*3/UL (ref 0.05–0.8)
MONOCYTES NFR BLD AUTO: 5.6 %
NEUTROPHILS # BLD AUTO: 3.32 X10*3/UL (ref 1.6–5.5)
NEUTROPHILS NFR BLD AUTO: 30.6 %
NRBC BLD-RTO: ABNORMAL /100{WBCS}
PLATELET # BLD AUTO: 164 X10*3/UL (ref 150–450)
POTASSIUM SERPL-SCNC: 4.7 MMOL/L (ref 3.5–5.3)
PROT SERPL-MCNC: 6.2 G/DL (ref 6.4–8.2)
RBC # BLD AUTO: 5.4 X10*6/UL (ref 4.5–5.9)
RBC MORPH BLD: NORMAL
SODIUM SERPL-SCNC: 138 MMOL/L (ref 136–145)
WBC # BLD AUTO: 10.9 X10*3/UL (ref 4.4–11.3)

## 2024-08-28 PROCEDURE — 3074F SYST BP LT 130 MM HG: CPT | Performed by: PHYSICIAN ASSISTANT

## 2024-08-28 PROCEDURE — 85025 COMPLETE CBC W/AUTO DIFF WBC: CPT | Performed by: PHYSICIAN ASSISTANT

## 2024-08-28 PROCEDURE — 1126F AMNT PAIN NOTED NONE PRSNT: CPT | Performed by: PHYSICIAN ASSISTANT

## 2024-08-28 PROCEDURE — 83615 LACTATE (LD) (LDH) ENZYME: CPT | Performed by: PHYSICIAN ASSISTANT

## 2024-08-28 PROCEDURE — 80053 COMPREHEN METABOLIC PANEL: CPT | Performed by: PHYSICIAN ASSISTANT

## 2024-08-28 PROCEDURE — 3079F DIAST BP 80-89 MM HG: CPT | Performed by: PHYSICIAN ASSISTANT

## 2024-08-28 PROCEDURE — 99213 OFFICE O/P EST LOW 20 MIN: CPT | Performed by: PHYSICIAN ASSISTANT

## 2024-08-28 PROCEDURE — 1159F MED LIST DOCD IN RCRD: CPT | Performed by: PHYSICIAN ASSISTANT

## 2024-08-28 PROCEDURE — 36415 COLL VENOUS BLD VENIPUNCTURE: CPT | Performed by: PHYSICIAN ASSISTANT

## 2024-08-28 PROCEDURE — 1123F ACP DISCUSS/DSCN MKR DOCD: CPT | Performed by: PHYSICIAN ASSISTANT

## 2024-08-28 PROCEDURE — 84153 ASSAY OF PSA TOTAL: CPT | Mod: GEALAB | Performed by: PHYSICIAN ASSISTANT

## 2024-08-28 ASSESSMENT — COLUMBIA-SUICIDE SEVERITY RATING SCALE - C-SSRS
1. IN THE PAST MONTH, HAVE YOU WISHED YOU WERE DEAD OR WISHED YOU COULD GO TO SLEEP AND NOT WAKE UP?: NO
2. HAVE YOU ACTUALLY HAD ANY THOUGHTS OF KILLING YOURSELF?: NO
6. HAVE YOU EVER DONE ANYTHING, STARTED TO DO ANYTHING, OR PREPARED TO DO ANYTHING TO END YOUR LIFE?: NO

## 2024-08-28 ASSESSMENT — PATIENT HEALTH QUESTIONNAIRE - PHQ9
SUM OF ALL RESPONSES TO PHQ9 QUESTIONS 1 AND 2: 0
1. LITTLE INTEREST OR PLEASURE IN DOING THINGS: NOT AT ALL
2. FEELING DOWN, DEPRESSED OR HOPELESS: NOT AT ALL

## 2024-08-28 ASSESSMENT — PAIN SCALES - GENERAL: PAINLEVEL: 0-NO PAIN

## 2024-08-28 NOTE — PROGRESS NOTES
Patient Visit Information:   Visit Type: Benign Heme Follow-up     Cancer History:   Treatment Synopsis:    72 yo male referred by Dr. Acuna for leucocytosis.     Patient had cholecystectomy in 7/2023, noted to have elevated WBC with intermittent lymphocytosis. Normal Hgb and platelet count.     On assessment, has recovered well from surgery. Reports good energy and appetite. Denies F/C/night sweats, unintentional weight loss, anorexia, fatigue, HA, change in vision/hearing, palpitations, CP, SOB, n/v/d/c/abd pain, bleeding, melena, LAD, peripheral neuropathy, rash.     PMH/PSH: HTN, HL, hypothyroidism, BPH, retinitis pigmentosa 3 hernia repair, cholecystectomy   FH: Dad-prostate cancer, Mom with uterine cancer, maternal aunt with breast cancer.  Soc Hx: denies smoking, MADALYN, illicit drugs; retired maintenance, /EMS, .    History of Present Illness:   Patient presents for follow up with his wife. Just returned from a trip to [place] feeling well. Seen by derm had precancerous moles removed. Treated for fungal infection. Denies B symptoms, bleeding from any source or any other complaints at this time.    Review of Systems:    12 pt ROS was performed, pertinent positive and negative findings as per HPI above, remainder of systems reviewed and are negative.    Allergies and Intolerances:  Allergies   Allergen Reactions    Amoxicillin-Pot Clavulanate Unknown    Penicillins Unknown     Outpatient medications:  Current Outpatient Medications on File Prior to Visit   Medication Sig Dispense Refill    aspirin 81 mg chewable tablet Chew once daily. CHEW AND SWALLOW 1 TABLET DAILY      levothyroxine (Synthroid, Levoxyl) 50 mcg tablet Take 1 tablet (50 mcg) by mouth once daily. 90 tablet 3    metoprolol succinate XL (Toprol-XL) 100 mg 24 hr tablet Take 1 tablet (100 mg) by mouth once daily. 90 tablet 3    rosuvastatin (Crestor) 10 mg tablet Take 1 tablet (10 mg) by mouth once daily. 90 tablet 3     No current  "facility-administered medications on file prior to visit.     Vitals:      2/15/2024    12:34 PM 2/22/2024    10:24 AM 2/26/2024    11:57 AM 3/29/2024     9:02 AM 3/29/2024    10:38 AM 3/29/2024    10:53 AM 8/28/2024     2:03 PM   Vitals   Systolic 137 128 130 159 114 111 119   Diastolic 78 86 80 76 72 77 80   Heart Rate 69 72 70 67 59 60 72   Temp 35.8 °C (96.4 °F)   36.3 °C (97.3 °F) 36.1 °C (97 °F)  36.3 °C (97.3 °F)   Resp 16  16 16 16 16 17   Height (in) 1.812 m (5' 11.34\") 1.803 m (5' 11\") 1.816 m (5' 11.5\") 1.803 m (5' 11\")      Weight (lb) 250.22 250 255.6 245.15   251.55   BMI 34.57 kg/m2 34.87 kg/m2 35.15 kg/m2 34.19 kg/m2   35.08 kg/m2   BSA (m2) 2.4 m2 2.38 m2 2.42 m2 2.36 m2   2.39 m2   Visit Report Report Report Report    Report     Physical Exam:   Constitutional: alert, awake and oriented, not in acute distress   HEENT: moist mucous membranes, normal nose   Neck: supple, no lymphadenopathy   EYES: PERRL, EOM intact, conjunctiva normal  Skin: no jaundice, rash or erythema  Neurological: AAOx3, no gross focal deficit   Psychiatric: normal mood and behavior     Assessment:    72 yo male referred by Dr. Acuna for leucocytosis with lymphocytosis.    PB flow shows immunophenotype is characteristic of what may be seen in chronic lymphocytic leukemia (CLL). 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 Discussed continued monitoring of labs and retesting PB flow if any changes in counts and/or symptoms. Discussed role of BMBx    Plan:    Reviewed and discussed lab, imaging, and pathology results with patient in detail as well as diagnosis, prognosis, and treatment options.    Discussed continued monitoring pf labs with CBCd, CMP, LDH every 6 months    Discussed chronic elevated Bilirubin likely Gilbert syndrome; continue to monitor.    F/U w/PCP    RTC in 6 months     Patient verbalized understanding, and all his " questions were answered to his satisfaction.     30 min spent with patient greater than 50 % of which was spent in consultation, counselling and coordination of care.

## 2024-08-29 LAB — PSA SERPL-MCNC: 1.54 NG/ML

## 2024-09-19 ENCOUNTER — APPOINTMENT (OUTPATIENT)
Dept: UROLOGY | Facility: CLINIC | Age: 74
End: 2024-09-19
Payer: MEDICARE

## 2024-11-04 NOTE — PROGRESS NOTES
"Subjective   Patient ID: Yadiel Solano is a 74 y.o. male who presents for Establish Care (Completely blind, leukemia, hemorrhoids).    Blindness: He has retinitis pigmentosa, diagnosed in his mid 40's, lost his peripheral vision and then a few years ago completely lost his vision. He has come to terms with this but he has been  for 8 years.     CLL: Diagnosed when he had extensive bruising after cholecystectomy with Dr. Acuna. Following with Sandy Esteves. He is getting surveillance through heme/onc. She told him to focus on it as more of a lymphoma than a leukemia.     Hemorrhoids: Diagnosed with colonoscopy with Dr. Acuna.     HLD: On rosuvastatin. He is taking coq10 now. Recommended it by Dr. Eid.     Hypothyoiridms: On replacement.    HTN: On metoprolol.    Review of systems completed and unremarkable other than what is documented in HPI.     Social history: non-smoker, rare alcohol use, He was a  for many years, he was a paint contractor  Medical history:  Medications:  SurgHx: cholecystectomy, tonsillectomy, 3 abdominal wall hernias,   Fhx: there is a lot of cancer in the family  Allergies: amoxicillin, PCN    Objective   /87 (BP Location: Left arm, Patient Position: Sitting, BP Cuff Size: Large adult)   Pulse 70   Ht 1.803 m (5' 11\")   Wt 116 kg (255 lb)   BMI 35.57 kg/m²     Gen: No acute distress, alert and oriented x3, pleasant   HEENT: moist mucous membranes, b/l external auditory canals are clear of debris, TMs within normal limits, no oropharyngeal lesions, eomi, perrla   Neck: thyroid within normal limits, no lymphadenopathy   CV: RRR, normal S1/S2, no murmur   Resp: Clear to auscultation bilaterally, no wheezes or rhonchi appreciated  Abd: soft, nontender, non-distended, no guarding/rigidity, bowel sounds present  Extr: no edema, no calf tenderness  Derm: Skin is warm and dry, no rashes appreciated  Psych: mood is good, affect is congruent, good hygiene, normal speech " and eye contact  Neuro: cranial nerves grossly intact, normal gait    Assessment/Plan     #Retinitis pigmentosa  Diagnosed at age 42  Completely blind    #CLL:   Following with Sandy Esteves    #Hemorrhoids:   Discussed supportive care    #HLD:   Controlled on rosuvastatin + coq10    #Hypothyoiridms:   Controlled on replacement    #HTN:  Controlled on metoprolol    HCM:  C-scope 2024, next due 2027

## 2024-11-11 ENCOUNTER — APPOINTMENT (OUTPATIENT)
Dept: PRIMARY CARE | Facility: CLINIC | Age: 74
End: 2024-11-11
Payer: MEDICARE

## 2024-11-11 VITALS
BODY MASS INDEX: 35.7 KG/M2 | WEIGHT: 255 LBS | SYSTOLIC BLOOD PRESSURE: 133 MMHG | HEIGHT: 71 IN | HEART RATE: 70 BPM | DIASTOLIC BLOOD PRESSURE: 87 MMHG

## 2024-11-11 DIAGNOSIS — E78.2 MIXED HYPERLIPIDEMIA: Primary | ICD-10-CM

## 2024-11-11 DIAGNOSIS — R73.09 ELEVATED GLUCOSE: ICD-10-CM

## 2024-11-11 DIAGNOSIS — E03.9 ADULT HYPOTHYROIDISM: ICD-10-CM

## 2024-11-11 PROCEDURE — 3079F DIAST BP 80-89 MM HG: CPT | Performed by: FAMILY MEDICINE

## 2024-11-11 PROCEDURE — 1159F MED LIST DOCD IN RCRD: CPT | Performed by: FAMILY MEDICINE

## 2024-11-11 PROCEDURE — 3075F SYST BP GE 130 - 139MM HG: CPT | Performed by: FAMILY MEDICINE

## 2024-11-11 PROCEDURE — 99214 OFFICE O/P EST MOD 30 MIN: CPT | Performed by: FAMILY MEDICINE

## 2024-11-11 PROCEDURE — 1123F ACP DISCUSS/DSCN MKR DOCD: CPT | Performed by: FAMILY MEDICINE

## 2024-11-11 PROCEDURE — 3008F BODY MASS INDEX DOCD: CPT | Performed by: FAMILY MEDICINE

## 2024-11-11 PROCEDURE — 1158F ADVNC CARE PLAN TLK DOCD: CPT | Performed by: FAMILY MEDICINE

## 2024-11-11 PROCEDURE — 1036F TOBACCO NON-USER: CPT | Performed by: FAMILY MEDICINE

## 2024-11-19 ENCOUNTER — LAB (OUTPATIENT)
Dept: LAB | Facility: LAB | Age: 74
End: 2024-11-19
Payer: MEDICARE

## 2024-11-19 DIAGNOSIS — E78.2 MIXED HYPERLIPIDEMIA: ICD-10-CM

## 2024-11-19 DIAGNOSIS — E03.9 ADULT HYPOTHYROIDISM: ICD-10-CM

## 2024-11-19 DIAGNOSIS — R73.09 ELEVATED GLUCOSE: ICD-10-CM

## 2024-11-19 LAB
ALBUMIN SERPL BCP-MCNC: 4.4 G/DL (ref 3.4–5)
ALP SERPL-CCNC: 43 U/L (ref 33–136)
ALT SERPL W P-5'-P-CCNC: 43 U/L (ref 10–52)
ANION GAP SERPL CALC-SCNC: 13 MMOL/L (ref 10–20)
AST SERPL W P-5'-P-CCNC: 32 U/L (ref 9–39)
BASOPHILS # BLD AUTO: 0.08 X10*3/UL (ref 0–0.1)
BASOPHILS NFR BLD AUTO: 0.5 %
BILIRUB SERPL-MCNC: 2.8 MG/DL (ref 0–1.2)
BUN SERPL-MCNC: 19 MG/DL (ref 6–23)
CALCIUM SERPL-MCNC: 9.4 MG/DL (ref 8.6–10.3)
CHLORIDE SERPL-SCNC: 102 MMOL/L (ref 98–107)
CO2 SERPL-SCNC: 28 MMOL/L (ref 21–32)
CREAT SERPL-MCNC: 0.84 MG/DL (ref 0.5–1.3)
EGFRCR SERPLBLD CKD-EPI 2021: >90 ML/MIN/1.73M*2
EOSINOPHIL # BLD AUTO: 0.26 X10*3/UL (ref 0–0.4)
EOSINOPHIL NFR BLD AUTO: 1.7 %
ERYTHROCYTE [DISTWIDTH] IN BLOOD BY AUTOMATED COUNT: 13.6 % (ref 11.5–14.5)
EST. AVERAGE GLUCOSE BLD GHB EST-MCNC: 117 MG/DL
GLUCOSE SERPL-MCNC: 101 MG/DL (ref 74–99)
HBA1C MFR BLD: 5.7 %
HCT VFR BLD AUTO: 50.5 % (ref 41–52)
HGB BLD-MCNC: 16.7 G/DL (ref 13.5–17.5)
IMM GRANULOCYTES # BLD AUTO: 0.04 X10*3/UL (ref 0–0.5)
IMM GRANULOCYTES NFR BLD AUTO: 0.3 % (ref 0–0.9)
LYMPHOCYTES # BLD AUTO: 9.92 X10*3/UL (ref 0.8–3)
LYMPHOCYTES NFR BLD AUTO: 66.6 %
MCH RBC QN AUTO: 30.3 PG (ref 26–34)
MCHC RBC AUTO-ENTMCNC: 33.1 G/DL (ref 32–36)
MCV RBC AUTO: 92 FL (ref 80–100)
MONOCYTES # BLD AUTO: 0.77 X10*3/UL (ref 0.05–0.8)
MONOCYTES NFR BLD AUTO: 5.2 %
NEUTROPHILS # BLD AUTO: 3.83 X10*3/UL (ref 1.6–5.5)
NEUTROPHILS NFR BLD AUTO: 25.7 %
NRBC BLD-RTO: 0 /100 WBCS (ref 0–0)
PLATELET # BLD AUTO: 152 X10*3/UL (ref 150–450)
POTASSIUM SERPL-SCNC: 4.5 MMOL/L (ref 3.5–5.3)
PROT SERPL-MCNC: 6.5 G/DL (ref 6.4–8.2)
RBC # BLD AUTO: 5.51 X10*6/UL (ref 4.5–5.9)
SODIUM SERPL-SCNC: 138 MMOL/L (ref 136–145)
TSH SERPL-ACNC: 3.5 MIU/L (ref 0.44–3.98)
WBC # BLD AUTO: 14.9 X10*3/UL (ref 4.4–11.3)

## 2024-11-19 PROCEDURE — 83036 HEMOGLOBIN GLYCOSYLATED A1C: CPT

## 2024-11-19 PROCEDURE — 36415 COLL VENOUS BLD VENIPUNCTURE: CPT

## 2024-11-22 NOTE — PROGRESS NOTES
Subjective   Patient ID: Yadiel Solano is a 74 y.o. male. He was kindly referred by Sandy Esteves PA-C.     HPI  Yadiel Solano is a 74 y.o. male who presents today, for a new patient consultation. He has moved and would like to establish care. He would like to have a routine prostate check.     He has a family history of prostate cancer; his father was diagnosed.     He experiences nocturia with one episode. He is able to empty his bladder with some thought.     He has calcium deposits in bilateral testicles, reassured earlier by Dr. Forte.       Lab Results   Component Value Date    PSA 1.54 08/28/2024    PSA 2.03 02/22/2023        Review of Systems  A complete review of systems was performed. All systems are noted to be negative unless indicated in the history of present illness, impression, active problem list, or past histories.    Objective   Physical Exam  General: Well developed, well nourished, alert and cooperative, appears in no acute distress  Eyes: blind  Cardiac: Extremities are warm and well perfused. No edema, cyanosis or pallor.   Lungs: Breathing is easy, non-labored. Speaking in clear and complete sentences. Normal diaphragmatic movement.  Abdomen: soft, non-tender  MSK: Ambulatory with steady gait, unassisted  Neuro: alert and oriented to person, place and time  Psych: Demonstrates good judgement and reason, without hallucinations, abnormal affect or abnormal behaviors.  Skin: no obvious lesions, no rashes.  : phallus circumcised, normal in size, no plaques or lesions. Testicles normal in size and texture, cysts palpated  AMY: prostate normal in size, smooth surface, no nodules     Assessment/Plan   Diagnoses and all orders for this visit:  Epididymal cyst  Prostate cancer screening  -     Referral to Urology      Yadiel Solano is a 74 y.o. male who presents for a new patient consultation.     I have reassured the patient that his PSA level is normal and he is generally healthy. Due to the  family history of prostate cancer, I would recommend to continue testing the PSA value until the age of 75. The chances of a prostatectomy is very low, because after the age of 75, surgery normally does not occur. Other treatment options are typically recommended before.     I have encouraged the patient to follow up with any concerns or symptoms. As he is not on any medication, I have recommended that he follow up with his primary care provider Xuan Guzman DO with yearly PSA.     He has epididymal cysts felt during assessment. I have advised the patient that he should not be concerned about these at this time. The cysts are benign and only require surgery if large and/or symptomatic.     Plan:  Reassure about prostate and epididymal cysts  F/U if any concerns   PSA yearly with PCP    Scribe Attestation  By signing my name below, IHéctor attest that this documentation has been prepared under the direction and in the presence of Stephanie Nguyen MD.    Scribe Attestation  By signing my name below, ISo Scribe   attest that this documentation has been prepared under the direction and in the presence of Stephanie Nguyen MD.

## 2024-11-27 ENCOUNTER — APPOINTMENT (OUTPATIENT)
Dept: UROLOGY | Facility: CLINIC | Age: 74
End: 2024-11-27
Payer: MEDICARE

## 2024-11-27 DIAGNOSIS — N50.3 EPIDIDYMAL CYST: Primary | ICD-10-CM

## 2024-11-27 DIAGNOSIS — Z12.5 PROSTATE CANCER SCREENING: ICD-10-CM

## 2024-11-27 PROCEDURE — 1123F ACP DISCUSS/DSCN MKR DOCD: CPT | Performed by: STUDENT IN AN ORGANIZED HEALTH CARE EDUCATION/TRAINING PROGRAM

## 2024-11-27 PROCEDURE — 99204 OFFICE O/P NEW MOD 45 MIN: CPT | Performed by: STUDENT IN AN ORGANIZED HEALTH CARE EDUCATION/TRAINING PROGRAM

## 2024-11-27 NOTE — LETTER
November 27, 2024     Sandy Esteves PA-C  [id number] [facility] [id number]    Patient: Yadiel Solano   YOB: 1950   Date of Visit: 11/27/2024       Dear Dr. Sandy Esteves PA-C:    Thank you for referring Yadiel Solano to me for evaluation. Below are my notes for this consultation.  If you have questions, please do not hesitate to call me. I look forward to following your patient along with you.       Sincerely,     Stephanie Nguyen MD      CC: No Recipients  ______________________________________________________________________________________    Subjective  Patient ID: Yadiel Solano is a 74 y.o. male. He was kindly referred by Sandy Esteves PA-C.     HPI  Yadiel Solano is a 74 y.o. male who presents today, for a new patient consultation. He has moved and would like to establish care. He would like to have a routine prostate check.     He has a family history of prostate cancer; his father was diagnosed.     He experiences nocturia with one episode. He is able to empty his bladder with some thought.     He has calcium deposits in bilateral testicles, reassured earlier by Dr. Forte.       Lab Results   Component Value Date    PSA 1.54 08/28/2024    PSA 2.03 02/22/2023        Review of Systems  A complete review of systems was performed. All systems are noted to be negative unless indicated in the history of present illness, impression, active problem list, or past histories.    Objective  Physical Exam  General: Well developed, well nourished, alert and cooperative, appears in no acute distress  Eyes: blind  Cardiac: Extremities are warm and well perfused. No edema, cyanosis or pallor.   Lungs: Breathing is easy, non-labored. Speaking in clear and complete sentences. Normal diaphragmatic movement.  Abdomen: soft, non-tender  MSK: Ambulatory with steady gait, unassisted  Neuro: alert and oriented to person, place and time  Psych: Demonstrates good judgement and reason, without hallucinations, abnormal  affect or abnormal behaviors.  Skin: no obvious lesions, no rashes.  : phallus circumcised, normal in size, no plaques or lesions. Testicles normal in size and texture, cysts palpated  AMY: prostate normal in size, smooth surface, no nodules     Assessment/Plan  Diagnoses and all orders for this visit:  Epididymal cyst  Prostate cancer screening  -     Referral to Urology      Yadiel Solano is a 74 y.o. male who presents for a new patient consultation.     I have reassured the patient that his PSA level is normal and he is generally healthy. Due to the family history of prostate cancer, I would recommend to continue testing the PSA value until the age of 75. The chances of a prostatectomy is very low, because after the age of 75, surgery normally does not occur. Other treatment options are typically recommended before.     I have encouraged the patient to follow up with any concerns or symptoms. As he is not on any medication, I have recommended that he follow up with his primary care provider Xuan Guzman DO with yearly PSA.     He has epididymal cysts felt during assessment. I have advised the patient that he should not be concerned about these at this time. The cysts are benign and only require surgery if large and/or symptomatic.     Plan:  Reassure about prostate and epididymal cysts  F/U if any concerns   PSA yearly with PCP    Scribe Attestation  By signing my name below, IHéctor attest that this documentation has been prepared under the direction and in the presence of Stephanie Nguyen MD.    Scribe Attestation  By signing my name below, So MARIN Scribe   attest that this documentation has been prepared under the direction and in the presence of Stephanie Nguyen MD.

## 2024-12-16 ENCOUNTER — NURSE TRIAGE (OUTPATIENT)
Dept: HEMATOLOGY/ONCOLOGY | Facility: CLINIC | Age: 74
End: 2024-12-16
Payer: MEDICARE

## 2024-12-16 NOTE — TELEPHONE ENCOUNTER
Returned patients call and explained that per Sandy, he should see dermatology and have it biopsied. Also explained he is ok to travel. Patient will call with any further issues or needs. Patient verbalized understanding and agreement regarding discussed information via verbal feedback.

## 2024-12-16 NOTE — TELEPHONE ENCOUNTER
Pt states that 6 months ago he noticed a pea size lump on the  back of his neck, just above his hair line. Over the last few months the lump has grown to the size of a marble and has become sensitive, 2/10 pain. Pt is wondering if he should be seen prior to his appointment on 02/18/2025. He has plans to travel to [place] next month and wants to be sure he is ok to travel. Please advise. Thank you    Additional Information   Do you have any concerns about infection?     Pt has a gum infection 1.5 months ago and took a while to heal. Denies any antibiotics   Commented on: What area is affected? (The most common areas of obstruction are the pelvic, inguinal, and axillary nodes)     Back of neck in hairline   Commented on: Where is your pain?     Lump on back of neck   Commented on: Are you having any of these problems?     None   Commented on: When did these problems start?     6 months, however, not larger and sore   Commented on: What helps these problems?     None   Commented on: What makes these problems worse?     Touching the area causes pain    Protocols used: Lymphedema

## 2025-02-18 ENCOUNTER — OFFICE VISIT (OUTPATIENT)
Dept: HEMATOLOGY/ONCOLOGY | Facility: CLINIC | Age: 75
End: 2025-02-18
Payer: MEDICARE

## 2025-02-18 VITALS
RESPIRATION RATE: 16 BRPM | HEART RATE: 62 BPM | OXYGEN SATURATION: 94 % | TEMPERATURE: 97.5 F | SYSTOLIC BLOOD PRESSURE: 132 MMHG | BODY MASS INDEX: 35.36 KG/M2 | DIASTOLIC BLOOD PRESSURE: 84 MMHG | WEIGHT: 253.53 LBS

## 2025-02-18 DIAGNOSIS — C91.10 CLL (CHRONIC LYMPHOCYTIC LEUKEMIA) (MULTI): ICD-10-CM

## 2025-02-18 DIAGNOSIS — D72.820 MONOCLONAL B-CELL LYMPHOCYTOSIS: ICD-10-CM

## 2025-02-18 DIAGNOSIS — R97.20 BENIGN PROSTATIC HYPERPLASIA WITH ELEVATED PROSTATE SPECIFIC ANTIGEN (PSA): ICD-10-CM

## 2025-02-18 DIAGNOSIS — N40.0 BENIGN PROSTATIC HYPERPLASIA WITH ELEVATED PROSTATE SPECIFIC ANTIGEN (PSA): ICD-10-CM

## 2025-02-18 LAB
ALBUMIN SERPL BCP-MCNC: 4.1 G/DL (ref 3.4–5)
ALP SERPL-CCNC: 40 U/L (ref 33–136)
ALT SERPL W P-5'-P-CCNC: 35 U/L (ref 10–52)
ANION GAP SERPL CALC-SCNC: 13 MMOL/L (ref 10–20)
AST SERPL W P-5'-P-CCNC: 29 U/L (ref 9–39)
BASOPHILS # BLD MANUAL: 0 X10*3/UL (ref 0–0.1)
BASOPHILS NFR BLD MANUAL: 0 %
BILIRUB SERPL-MCNC: 2.3 MG/DL (ref 0–1.2)
BUN SERPL-MCNC: 16 MG/DL (ref 6–23)
CALCIUM SERPL-MCNC: 9.3 MG/DL (ref 8.6–10.3)
CHLORIDE SERPL-SCNC: 103 MMOL/L (ref 98–107)
CO2 SERPL-SCNC: 25 MMOL/L (ref 21–32)
CREAT SERPL-MCNC: 0.86 MG/DL (ref 0.5–1.3)
EGFRCR SERPLBLD CKD-EPI 2021: >90 ML/MIN/1.73M*2
EOSINOPHIL # BLD MANUAL: 0 X10*3/UL (ref 0–0.4)
EOSINOPHIL NFR BLD MANUAL: 0 %
ERYTHROCYTE [DISTWIDTH] IN BLOOD BY AUTOMATED COUNT: 13.6 % (ref 11.5–14.5)
GLUCOSE SERPL-MCNC: 103 MG/DL (ref 74–99)
HCT VFR BLD AUTO: 49.8 % (ref 41–52)
HGB BLD-MCNC: 16.3 G/DL (ref 13.5–17.5)
IMM GRANULOCYTES # BLD AUTO: 0.03 X10*3/UL (ref 0–0.5)
IMM GRANULOCYTES NFR BLD AUTO: 0.2 % (ref 0–0.9)
LDH SERPL L TO P-CCNC: 176 U/L (ref 84–246)
LYMPHOCYTES # BLD MANUAL: 7.83 X10*3/UL (ref 0.8–3)
LYMPHOCYTES NFR BLD MANUAL: 54 %
MCH RBC QN AUTO: 30 PG (ref 26–34)
MCHC RBC AUTO-ENTMCNC: 32.7 G/DL (ref 32–36)
MCV RBC AUTO: 92 FL (ref 80–100)
MONOCYTES # BLD MANUAL: 0.58 X10*3/UL (ref 0.05–0.8)
MONOCYTES NFR BLD MANUAL: 4 %
NEUTS SEG # BLD MANUAL: 3.77 X10*3/UL (ref 1.6–5)
NEUTS SEG NFR BLD MANUAL: 26 %
NRBC BLD-RTO: ABNORMAL /100{WBCS}
PLATELET # BLD AUTO: 100 X10*3/UL (ref 150–450)
PLATELET CLUMP BLD QL SMEAR: PRESENT
POTASSIUM SERPL-SCNC: 4.4 MMOL/L (ref 3.5–5.3)
PROT SERPL-MCNC: 6.7 G/DL (ref 6.4–8.2)
RBC # BLD AUTO: 5.43 X10*6/UL (ref 4.5–5.9)
RBC MORPH BLD: ABNORMAL
SODIUM SERPL-SCNC: 137 MMOL/L (ref 136–145)
TOTAL CELLS COUNTED BLD: 100
VARIANT LYMPHS # BLD MANUAL: 2.32 X10*3/UL (ref 0–0.3)
VARIANT LYMPHS NFR BLD: 16 %
WBC # BLD AUTO: 14.5 X10*3/UL (ref 4.4–11.3)

## 2025-02-18 PROCEDURE — 1126F AMNT PAIN NOTED NONE PRSNT: CPT | Performed by: PHYSICIAN ASSISTANT

## 2025-02-18 PROCEDURE — 3079F DIAST BP 80-89 MM HG: CPT | Performed by: PHYSICIAN ASSISTANT

## 2025-02-18 PROCEDURE — 3075F SYST BP GE 130 - 139MM HG: CPT | Performed by: PHYSICIAN ASSISTANT

## 2025-02-18 PROCEDURE — 85027 COMPLETE CBC AUTOMATED: CPT | Performed by: PHYSICIAN ASSISTANT

## 2025-02-18 PROCEDURE — 99214 OFFICE O/P EST MOD 30 MIN: CPT | Performed by: PHYSICIAN ASSISTANT

## 2025-02-18 PROCEDURE — 85007 BL SMEAR W/DIFF WBC COUNT: CPT | Performed by: PHYSICIAN ASSISTANT

## 2025-02-18 PROCEDURE — 83615 LACTATE (LD) (LDH) ENZYME: CPT | Performed by: PHYSICIAN ASSISTANT

## 2025-02-18 PROCEDURE — 1123F ACP DISCUSS/DSCN MKR DOCD: CPT | Performed by: PHYSICIAN ASSISTANT

## 2025-02-18 PROCEDURE — 36415 COLL VENOUS BLD VENIPUNCTURE: CPT | Performed by: PHYSICIAN ASSISTANT

## 2025-02-18 PROCEDURE — 80053 COMPREHEN METABOLIC PANEL: CPT | Performed by: PHYSICIAN ASSISTANT

## 2025-02-18 ASSESSMENT — PAIN SCALES - GENERAL: PAINLEVEL_OUTOF10: 0-NO PAIN

## 2025-02-18 NOTE — PROGRESS NOTES
Patient Visit Information:   Visit Type: Benign Heme Follow-up     Cancer History:   Treatment Synopsis:    72 yo male referred by Dr. Acuna for leucocytosis.     Patient had cholecystectomy in 7/2023, noted to have elevated WBC with intermittent lymphocytosis. Normal Hgb and platelet count.     On assessment, has recovered well from surgery. Reports good energy and appetite. Denies F/C/night sweats, unintentional weight loss, anorexia, fatigue, HA, change in vision/hearing, palpitations, CP, SOB, n/v/d/c/abd pain, bleeding, melena, LAD, peripheral neuropathy, rash.     PMH/PSH: HTN, HL, hypothyroidism, BPH, retinitis pigmentosa 3 hernia repair, cholecystectomy   FH: Dad-prostate cancer, Mom with uterine cancer, maternal aunt with breast cancer.  Soc Hx: denies smoking, MADALYN, illicit drugs; retired maintenance, /EMS, .    History of Present Illness:   Patient presents for follow up with his wife. Seen by derm had precancerous moles removed. Treated for fungal infection on chest/stomach with selsum blue. Denies B symptoms, bleeding from any source or any other complaints at this time.    Review of Systems:    12 pt ROS was performed, pertinent positive and negative findings as per HPI above, remainder of systems reviewed and are negative.    Allergies and Intolerances:  Allergies   Allergen Reactions    Amoxicillin-Pot Clavulanate Unknown    Penicillins Unknown     Outpatient medications:  Current Outpatient Medications on File Prior to Visit   Medication Sig Dispense Refill    aspirin 81 mg chewable tablet Chew once daily. CHEW AND SWALLOW 1 TABLET DAILY      levothyroxine (Synthroid, Levoxyl) 50 mcg tablet Take 1 tablet (50 mcg) by mouth once daily. 90 tablet 3    metoprolol succinate XL (Toprol-XL) 100 mg 24 hr tablet Take 1 tablet (100 mg) by mouth once daily. 90 tablet 3    rosuvastatin (Crestor) 10 mg tablet Take 1 tablet (10 mg) by mouth once daily. 90 tablet 3     No current  "facility-administered medications on file prior to visit.     Vitals:      2/26/2024    11:57 AM 3/29/2024     9:02 AM 3/29/2024    10:38 AM 3/29/2024    10:53 AM 8/28/2024     2:03 PM 11/11/2024    11:27 AM 2/18/2025     8:52 AM   Vitals   Systolic 130 159 114 111 119 133 132   Diastolic 80 76 72 77 80 87 84   BP Location   Left arm Left arm Left arm Left arm Left arm   Heart Rate 70 67 59 60 72 70 62   Temp  36.3 °C (97.3 °F) 36.1 °C (97 °F)  36.3 °C (97.3 °F)  36.4 °C (97.5 °F)   Resp 16 16 16 16 17  16   Height 1.816 m (5' 11.5\") 1.803 m (5' 11\")    1.803 m (5' 11\")    Weight (lb) 255.6 245.15   251.55 255 253.53   BMI 35.15 kg/m2 34.19 kg/m2   35.08 kg/m2 35.57 kg/m2 35.36 kg/m2   BSA (m2) 2.42 m2 2.36 m2   2.39 m2 2.41 m2 2.4 m2   Visit Report Report    Report Report Report     Physical Exam:   Constitutional: alert, awake and oriented, not in acute distress   HEENT: moist mucous membranes, normal nose   Neck: supple, no lymphadenopathy   EYES: PERRL, EOM intact, conjunctiva normal  Skin: no jaundice, rash or erythema  Neurological: AAOx3, no gross focal deficit   Psychiatric: normal mood and behavior     Labs:  Lab Results   Component Value Date    WBC 14.5 (H) 02/18/2025    NEUTROABS 3.83 11/19/2024    IGABSOL 0.04 11/19/2024    LYMPHSABS 9.92 (H) 11/19/2024    MONOSABS 0.77 11/19/2024    EOSABS 0.26 11/19/2024    BASOSABS 0.08 11/19/2024    RBC 5.43 02/18/2025    MCV 92 02/18/2025    MCHC 32.7 02/18/2025    HGB 16.3 02/18/2025    HCT 49.8 02/18/2025     (L) 02/18/2025     No results found for: \"RETICCTPCT\"   Lab Results   Component Value Date    CREATININE 0.84 11/19/2024    BUN 19 11/19/2024    EGFR >90 11/19/2024     11/19/2024    K 4.5 11/19/2024     11/19/2024    CO2 28 11/19/2024      Lab Results   Component Value Date    ALT 43 11/19/2024    AST 32 11/19/2024    ALKPHOS 43 11/19/2024    BILITOT 2.8 (H) 11/19/2024        Assessment:    74 yo male referred by Dr. Acuna for " leucocytosis with lymphocytosis.    PB flow shows immunophenotype is characteristic of what may be seen in chronic lymphocytic leukemia (CLL). 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 Discussed continued monitoring of labs and retesting PB flow if any changes in counts and/or symptoms. Discussed role of BMBx    Plan:    Reviewed and discussed lab, imaging, and pathology results with patient in detail as well as diagnosis, prognosis, and treatment options.    Discussed continued monitoring pf labs with CBCd, CMP, LDH every 6 months.    Platelet count is 100 will repeat in 3 months     Discussed chronic elevated Bilirubin likely Gilbert syndrome; continue to monitor.    F/U w/PCP    RTC in 6 months     Patient verbalized understanding, and all his questions were answered to his satisfaction.     30 min spent with patient greater than 50 % of which was spent in consultation, counselling and coordination of care.

## 2025-02-25 ENCOUNTER — APPOINTMENT (OUTPATIENT)
Dept: CARDIOLOGY | Facility: CLINIC | Age: 75
End: 2025-02-25
Payer: MEDICARE

## 2025-02-25 ENCOUNTER — APPOINTMENT (OUTPATIENT)
Dept: HEMATOLOGY/ONCOLOGY | Facility: CLINIC | Age: 75
End: 2025-02-25
Payer: MEDICARE

## 2025-02-26 ENCOUNTER — APPOINTMENT (OUTPATIENT)
Dept: PRIMARY CARE | Facility: CLINIC | Age: 75
End: 2025-02-26
Payer: MEDICARE

## 2025-03-12 NOTE — PROGRESS NOTES
"Subjective   Patient ID: Yadiel Solano is a 75 y.o. male who presents for Medicare Annual Wellness Visit Subsequent (4 months; platelets are down; ).    Low platelets: He had a rash across his chest, his derm saw it (Ariela at [facility]).     Blindness: He has retinitis pigmentosa, diagnosed in his mid 40's, lost his peripheral vision and then a few years ago completely lost his vision. He has come to terms with this but he has been  for 8 years.      CLL: Diagnosed when he had extensive bruising after cholecystectomy with Dr. Acuna. Following with Sandy Esteves. He is getting surveillance through heme/onc. She told him to focus on it as more of a lymphoma than a leukemia.      Hemorrhoids: Diagnosed with colonoscopy with Dr. Acuna.      HLD: On rosuvastatin. Recommended it by Dr. Eid.      Hypothyoiridms: On replacement.     HTN: On metoprolol.     Review of systems completed and unremarkable other than what is documented in HPI.       Objective   /81 (BP Location: Left arm, Patient Position: Sitting, BP Cuff Size: Large adult)   Pulse 72   Ht 1.803 m (5' 11\")   Wt 116 kg (255 lb)   BMI 35.57 kg/m²     Gen: No acute distress, alert and oriented x3, pleasant   HEENT: moist mucous membranes, b/l external auditory canals are clear of debris, TMs within normal limits, no oropharyngeal lesions, eomi, perrla   Neck: thyroid within normal limits, no lymphadenopathy   CV: RRR, normal S1/S2, no murmur   Resp: Clear to auscultation bilaterally, no wheezes or rhonchi appreciated  Abd: soft, nontender, non-distended, no guarding/rigidity, bowel sounds present  Extr: no edema, no calf tenderness  Derm: Skin is warm and dry, no rashes appreciated  Psych: mood is good, affect is congruent, good hygiene, normal speech and eye contact  Neuro: cranial nerves grossly intact, normal gait    No data recorded        Assessment/Plan   #Low plt  Monitor    #Retinitis pigmentosa  Diagnosed at age 42  Completely blind   "   #CLL:   Following with Sandy Esteves     #Hemorrhoids:   Discussed supportive care     #HLD:   Controlled on rosuvastatin     #Hypothyoiridms:   Controlled on replacement     #HTN:  Controlled on metoprolol     HCM:  C-scope 2024, next due 2027

## 2025-03-18 ENCOUNTER — APPOINTMENT (OUTPATIENT)
Dept: PRIMARY CARE | Facility: CLINIC | Age: 75
End: 2025-03-18
Payer: MEDICARE

## 2025-03-18 VITALS
DIASTOLIC BLOOD PRESSURE: 81 MMHG | HEART RATE: 72 BPM | WEIGHT: 255 LBS | BODY MASS INDEX: 35.7 KG/M2 | SYSTOLIC BLOOD PRESSURE: 126 MMHG | HEIGHT: 71 IN

## 2025-03-18 DIAGNOSIS — E78.5 HYPERLIPIDEMIA, UNSPECIFIED HYPERLIPIDEMIA TYPE: Primary | ICD-10-CM

## 2025-03-18 PROCEDURE — 1123F ACP DISCUSS/DSCN MKR DOCD: CPT | Performed by: FAMILY MEDICINE

## 2025-03-18 PROCEDURE — 3079F DIAST BP 80-89 MM HG: CPT | Performed by: FAMILY MEDICINE

## 2025-03-18 PROCEDURE — G0439 PPPS, SUBSEQ VISIT: HCPCS | Performed by: FAMILY MEDICINE

## 2025-03-18 PROCEDURE — 3074F SYST BP LT 130 MM HG: CPT | Performed by: FAMILY MEDICINE

## 2025-03-18 PROCEDURE — 1158F ADVNC CARE PLAN TLK DOCD: CPT | Performed by: FAMILY MEDICINE

## 2025-03-18 PROCEDURE — 1170F FXNL STATUS ASSESSED: CPT | Performed by: FAMILY MEDICINE

## 2025-03-18 PROCEDURE — 1036F TOBACCO NON-USER: CPT | Performed by: FAMILY MEDICINE

## 2025-03-18 PROCEDURE — 3008F BODY MASS INDEX DOCD: CPT | Performed by: FAMILY MEDICINE

## 2025-03-18 ASSESSMENT — PATIENT HEALTH QUESTIONNAIRE - PHQ9
1. LITTLE INTEREST OR PLEASURE IN DOING THINGS: NOT AT ALL
2. FEELING DOWN, DEPRESSED OR HOPELESS: NOT AT ALL
SUM OF ALL RESPONSES TO PHQ9 QUESTIONS 1 AND 2: 0

## 2025-03-18 ASSESSMENT — ACTIVITIES OF DAILY LIVING (ADL)
MANAGING_FINANCES: INDEPENDENT
BATHING: INDEPENDENT
DRESSING: INDEPENDENT
DOING_HOUSEWORK: INDEPENDENT
TAKING_MEDICATION: INDEPENDENT
GROCERY_SHOPPING: INDEPENDENT

## 2025-03-18 NOTE — PATIENT INSTRUCTIONS
Schedule an appointment for labs at [email or web address] or call [phone] 24 hours a day, 7 days a week.   You can also download the [facility] lab scheduling mihai on your phone.

## 2025-03-20 ENCOUNTER — APPOINTMENT (OUTPATIENT)
Dept: CARDIOLOGY | Facility: HOSPITAL | Age: 75
End: 2025-03-20
Payer: MEDICARE

## 2025-03-20 VITALS
HEIGHT: 72 IN | DIASTOLIC BLOOD PRESSURE: 70 MMHG | SYSTOLIC BLOOD PRESSURE: 136 MMHG | WEIGHT: 253 LBS | HEART RATE: 63 BPM | BODY MASS INDEX: 34.27 KG/M2

## 2025-03-20 DIAGNOSIS — I10 BENIGN ESSENTIAL HYPERTENSION: ICD-10-CM

## 2025-03-20 LAB
ATRIAL RATE: 63 BPM
P AXIS: 57 DEGREES
P OFFSET: 196 MS
P ONSET: 136 MS
PR INTERVAL: 176 MS
Q ONSET: 224 MS
QRS COUNT: 10 BEATS
QRS DURATION: 100 MS
QT INTERVAL: 408 MS
QTC CALCULATION(BAZETT): 417 MS
QTC FREDERICIA: 414 MS
R AXIS: 4 DEGREES
T AXIS: 28 DEGREES
T OFFSET: 428 MS
VENTRICULAR RATE: 63 BPM

## 2025-03-20 PROCEDURE — 3008F BODY MASS INDEX DOCD: CPT | Performed by: INTERNAL MEDICINE

## 2025-03-20 PROCEDURE — 93010 ELECTROCARDIOGRAM REPORT: CPT | Performed by: INTERNAL MEDICINE

## 2025-03-20 PROCEDURE — 1159F MED LIST DOCD IN RCRD: CPT | Performed by: INTERNAL MEDICINE

## 2025-03-20 PROCEDURE — 99213 OFFICE O/P EST LOW 20 MIN: CPT | Performed by: INTERNAL MEDICINE

## 2025-03-20 PROCEDURE — 1036F TOBACCO NON-USER: CPT | Performed by: INTERNAL MEDICINE

## 2025-03-20 PROCEDURE — 1160F RVW MEDS BY RX/DR IN RCRD: CPT | Performed by: INTERNAL MEDICINE

## 2025-03-20 PROCEDURE — 99213 OFFICE O/P EST LOW 20 MIN: CPT | Mod: 25 | Performed by: INTERNAL MEDICINE

## 2025-03-20 PROCEDURE — 93005 ELECTROCARDIOGRAM TRACING: CPT | Performed by: INTERNAL MEDICINE

## 2025-03-20 PROCEDURE — 3078F DIAST BP <80 MM HG: CPT | Performed by: INTERNAL MEDICINE

## 2025-03-20 PROCEDURE — 3075F SYST BP GE 130 - 139MM HG: CPT | Performed by: INTERNAL MEDICINE

## 2025-03-20 PROCEDURE — 1123F ACP DISCUSS/DSCN MKR DOCD: CPT | Performed by: INTERNAL MEDICINE

## 2025-03-20 ASSESSMENT — ENCOUNTER SYMPTOMS
OCCASIONAL FEELINGS OF UNSTEADINESS: 0
DEPRESSION: 0
LOSS OF SENSATION IN FEET: 0

## 2025-03-20 NOTE — PATIENT INSTRUCTIONS
Continue all current medications as prescribed.  Followup with Dr. uHtchins on an as needed basis.    If you have any questions or cardiac concerns, please call our office at [phone].

## 2025-03-20 NOTE — PROGRESS NOTES
Counseling:  The patient was counseled regarding diagnostic results, instructions for management, risk factor reductions, prognosis, patient and family education, impressions, risks and benefits of treatment options and importance of compliance with treatment.      Chief Complaint:   The patient presents today for annual followup of HTN.     History Of Present Illness:    Yadiel Solano is a 75 year old male patient who presents in the company of his wife for annual followup of HTN. His PMH is significant for hypothyroidism, CLL, HTN, BPH, obesity and hyperlipidemia. Over the past year, the patient states that he has done well from a cardiac standpoint. He denies any CP, chest discomfort or SOB. BP has been stable. EKG today shows NSR with no acute changes. The patient is compliant with his prescribed medications.      Last Recorded Vitals:  Vitals:    03/20/25 1134   BP: 136/70   BP Location: Right arm   Pulse: 63   Weight: 115 kg (253 lb)   Height: 1.829 m (6')       Past Surgical History:  He has a past surgical history that includes Hernia repair (03/07/2016); Tonsillectomy (03/07/2016); and Cholecystectomy.      Social History:  He reports that he has never smoked. He has never used smokeless tobacco. He reports that he does not drink alcohol and does not use drugs.    Family History:  Family History   Problem Relation Name Age of Onset    Uterine cancer Mother Oriana     Hypertension Mother Oriana         Allergies:  Amoxicillin-pot clavulanate and Penicillins    Outpatient Medications:  Current Outpatient Medications   Medication Instructions    aspirin 81 mg chewable tablet Daily RT    levothyroxine (SYNTHROID, LEVOXYL) 50 mcg, oral, Daily    metoprolol succinate XL (TOPROL-XL) 100 mg, oral, Daily    rosuvastatin (CRESTOR) 10 mg, oral, Daily     Review of Systems   All other systems reviewed and are negative.     Physical Exam:  Constitutional:       Appearance: Healthy appearance. Not in distress.   Neck:       Vascular: No JVR. JVD normal.   Pulmonary:      Effort: Pulmonary effort is normal.      Breath sounds: Normal breath sounds. No wheezing. No rhonchi. No rales.   Chest:      Chest wall: Not tender to palpatation.   Cardiovascular:      PMI at left midclavicular line. Normal rate. Regular rhythm. Normal S1. Normal S2.       Murmurs: There is no murmur.      No gallop.  No click. No rub.   Pulses:     Intact distal pulses.   Edema:     Peripheral edema absent.   Abdominal:      General: Bowel sounds are normal.      Palpations: Abdomen is soft.      Tenderness: There is no abdominal tenderness.   Musculoskeletal: Normal range of motion.         General: No tenderness. Skin:     General: Skin is warm and dry.   Neurological:      General: No focal deficit present.      Mental Status: Alert and oriented to person, place and time.          Last Labs:  CBC -  Lab Results   Component Value Date    WBC 14.5 (H) 02/18/2025    HGB 16.3 02/18/2025    HCT 49.8 02/18/2025    MCV 92 02/18/2025     (L) 02/18/2025       CMP -  Lab Results   Component Value Date    CALCIUM 9.3 02/18/2025    PROT 6.7 02/18/2025    ALBUMIN 4.1 02/18/2025    AST 29 02/18/2025    ALT 35 02/18/2025    ALKPHOS 40 02/18/2025    BILITOT 2.3 (H) 02/18/2025       LIPID PANEL -   Lab Results   Component Value Date    CHOL 108 02/22/2023    TRIG 180 (H) 02/22/2023    HDL 28.0 (A) 02/22/2023    CHHDL 3.9 02/22/2023    LDLF 44 02/22/2023    VLDL 36 02/22/2023    NHDL 114 09/23/2020       RENAL FUNCTION PANEL -   Lab Results   Component Value Date    GLUCOSE 103 (H) 02/18/2025     02/18/2025    K 4.4 02/18/2025     02/18/2025    CO2 25 02/18/2025    ANIONGAP 13 02/18/2025    BUN 16 02/18/2025    CREATININE 0.86 02/18/2025    GFRMALE >90 08/10/2023    CALCIUM 9.3 02/18/2025    ALBUMIN 4.1 02/18/2025        Last Cardiology Tests:  08/31/2017 - Stress Test  1. Abnormal graded exercise stress test secondary to EKG changes without chest pain. Above  average exercise capacity. Woodall treadmill score is a 4, suggesting an intermediate risk for mortality.   2. Normal myocardial perfusion scan with well-preserved LV function after exercise stress.      06/13/2017 - Stress Test  Abnormal graded exercise stress test secondary to electrocardiogram changes without chest pain. Average exercise capacity. Woodall treadmill score is -2 suggesting an intermediate risk for mortality.      Lab review: I have personally reviewed the laboratory result(s).    Assessment/Plan   1) HTN  On metoprolol succinate 100 mg daily  Denies CP, chest discomfort or SOB  BP stable  EKG stable  Continue current medical Rx   Patient has moved and will be establishing with cardiology closer to new home  Followup as needed    2) CLL  Being followed at [facility]  Chemotherapy not currently being recommended       Scribe Attestation  By signing my name below, I, Larissa Toth   attest that this documentation has been prepared under the direction and in the presence of Abram Hutchins MD.

## 2025-03-21 LAB
CHOLEST SERPL-MCNC: 112 MG/DL
CHOLEST/HDLC SERPL: 3.7 (CALC)
HDLC SERPL-MCNC: 30 MG/DL
LDLC SERPL CALC-MCNC: 57 MG/DL (CALC)
NONHDLC SERPL-MCNC: 82 MG/DL (CALC)
TRIGL SERPL-MCNC: 175 MG/DL

## 2025-03-28 ENCOUNTER — TELEPHONE (OUTPATIENT)
Dept: HEMATOLOGY/ONCOLOGY | Facility: CLINIC | Age: 75
End: 2025-03-28
Payer: MEDICARE

## 2025-03-28 ENCOUNTER — TELEPHONE (OUTPATIENT)
Dept: ADMISSION | Facility: HOSPITAL | Age: 75
End: 2025-03-28
Payer: MEDICARE

## 2025-03-28 DIAGNOSIS — N64.4 NIPPLE PAIN: Primary | ICD-10-CM

## 2025-03-28 NOTE — TELEPHONE ENCOUNTER
Carmen called and needed to speak to [facility] for Sandy Esteves PA-C. I gave them direct number to [facility].

## 2025-03-28 NOTE — TELEPHONE ENCOUNTER
Called and spoke to patient regarding his right tender nipple. He reports that it started a month and a half ago.  He was being followed by derm and thought it was related to  versatility fungal rash.  The rash has resolved but continues with tenderness of rt nipple area.  No redness, swelling or drainage at site. Does not feel hard.  Will discuss with Sandy Esteves for further recommendations. Patient agreed to plan and verbalized understanding using teach back method.

## 2025-04-03 DIAGNOSIS — E80.6 ACQUIRED HYPERBILIRUBINEMIA: ICD-10-CM

## 2025-04-03 DIAGNOSIS — I10 PRIMARY HYPERTENSION: ICD-10-CM

## 2025-04-03 RX ORDER — METOPROLOL SUCCINATE 100 MG/1
100 TABLET, EXTENDED RELEASE ORAL DAILY
Qty: 90 TABLET | Refills: 3 | Status: SHIPPED | OUTPATIENT
Start: 2025-04-03 | End: 2026-04-03

## 2025-04-03 RX ORDER — ROSUVASTATIN CALCIUM 10 MG/1
10 TABLET, COATED ORAL DAILY
Qty: 90 TABLET | Refills: 3 | Status: SHIPPED | OUTPATIENT
Start: 2025-04-03 | End: 2026-04-03

## 2025-04-07 DIAGNOSIS — N64.4 BREAST PAIN, RIGHT: Primary | ICD-10-CM

## 2025-04-08 ENCOUNTER — HOSPITAL ENCOUNTER (OUTPATIENT)
Dept: RADIOLOGY | Facility: HOSPITAL | Age: 75
Discharge: HOME | End: 2025-04-08
Payer: MEDICARE

## 2025-04-08 VITALS — BODY MASS INDEX: 34.27 KG/M2 | WEIGHT: 253 LBS | HEIGHT: 72 IN

## 2025-04-08 DIAGNOSIS — N64.4 BREAST PAIN, RIGHT: ICD-10-CM

## 2025-04-08 DIAGNOSIS — N64.4 NIPPLE PAIN: ICD-10-CM

## 2025-04-08 PROCEDURE — 77066 DX MAMMO INCL CAD BI: CPT

## 2025-04-08 PROCEDURE — 77062 BREAST TOMOSYNTHESIS BI: CPT

## 2025-04-08 PROCEDURE — 77066 DX MAMMO INCL CAD BI: CPT | Performed by: STUDENT IN AN ORGANIZED HEALTH CARE EDUCATION/TRAINING PROGRAM

## 2025-04-08 PROCEDURE — G0279 TOMOSYNTHESIS, MAMMO: HCPCS | Performed by: STUDENT IN AN ORGANIZED HEALTH CARE EDUCATION/TRAINING PROGRAM

## 2025-04-25 ENCOUNTER — LAB (OUTPATIENT)
Dept: LAB | Facility: HOSPITAL | Age: 75
End: 2025-04-25
Payer: MEDICARE

## 2025-04-25 ENCOUNTER — APPOINTMENT (OUTPATIENT)
Dept: LAB | Facility: HOSPITAL | Age: 75
End: 2025-04-25
Payer: MEDICARE

## 2025-04-25 ENCOUNTER — TELEPHONE (OUTPATIENT)
Dept: HEMATOLOGY/ONCOLOGY | Facility: CLINIC | Age: 75
End: 2025-04-25

## 2025-04-25 DIAGNOSIS — C91.10 CLL (CHRONIC LYMPHOCYTIC LEUKEMIA) (MULTI): Primary | ICD-10-CM

## 2025-04-25 DIAGNOSIS — D72.820 MONOCLONAL B-CELL LYMPHOCYTOSIS: ICD-10-CM

## 2025-04-25 DIAGNOSIS — R97.20 BENIGN PROSTATIC HYPERPLASIA WITH ELEVATED PROSTATE SPECIFIC ANTIGEN (PSA): ICD-10-CM

## 2025-04-25 DIAGNOSIS — C91.10 CLL (CHRONIC LYMPHOCYTIC LEUKEMIA) (MULTI): ICD-10-CM

## 2025-04-25 DIAGNOSIS — N40.0 BENIGN PROSTATIC HYPERPLASIA WITH ELEVATED PROSTATE SPECIFIC ANTIGEN (PSA): ICD-10-CM

## 2025-04-25 LAB
BASOPHILS # BLD AUTO: 0.08 X10*3/UL (ref 0–0.1)
BASOPHILS NFR BLD AUTO: 0.5 %
EOSINOPHIL # BLD AUTO: 0.26 X10*3/UL (ref 0–0.4)
EOSINOPHIL NFR BLD AUTO: 1.8 %
ERYTHROCYTE [DISTWIDTH] IN BLOOD BY AUTOMATED COUNT: 13.7 % (ref 11.5–14.5)
HCT VFR BLD AUTO: 48.3 % (ref 41–52)
HGB BLD-MCNC: 16.4 G/DL (ref 13.5–17.5)
IMM GRANULOCYTES # BLD AUTO: 0.04 X10*3/UL (ref 0–0.5)
IMM GRANULOCYTES NFR BLD AUTO: 0.3 % (ref 0–0.9)
LYMPHOCYTES # BLD AUTO: 10.08 X10*3/UL (ref 0.8–3)
LYMPHOCYTES NFR BLD AUTO: 68.8 %
MCH RBC QN AUTO: 30.6 PG (ref 26–34)
MCHC RBC AUTO-ENTMCNC: 34 G/DL (ref 32–36)
MCV RBC AUTO: 90 FL (ref 80–100)
MONOCYTES # BLD AUTO: 0.65 X10*3/UL (ref 0.05–0.8)
MONOCYTES NFR BLD AUTO: 4.4 %
NEUTROPHILS # BLD AUTO: 3.54 X10*3/UL (ref 1.6–5.5)
NEUTROPHILS NFR BLD AUTO: 24.2 %
NRBC BLD-RTO: 0 /100 WBCS (ref 0–0)
OVALOCYTES BLD QL SMEAR: NORMAL
PLATELET # BLD AUTO: 166 X10*3/UL (ref 150–450)
RBC # BLD AUTO: 5.36 X10*6/UL (ref 4.5–5.9)
RBC MORPH BLD: NORMAL
WBC # BLD AUTO: 14.7 X10*3/UL (ref 4.4–11.3)

## 2025-04-25 PROCEDURE — 83615 LACTATE (LD) (LDH) ENZYME: CPT

## 2025-04-25 PROCEDURE — 85025 COMPLETE CBC W/AUTO DIFF WBC: CPT

## 2025-04-25 PROCEDURE — 80053 COMPREHEN METABOLIC PANEL: CPT

## 2025-04-26 ENCOUNTER — LAB (OUTPATIENT)
Dept: LAB | Facility: HOSPITAL | Age: 75
End: 2025-04-26
Payer: MEDICARE

## 2025-04-26 DIAGNOSIS — C91.10 CHRONIC LYMPHOCYTIC LEUKEMIA OF B-CELL TYPE NOT HAVING ACHIEVED REMISSION (MULTI): Primary | ICD-10-CM

## 2025-04-26 LAB
ALBUMIN SERPL BCP-MCNC: 4.6 G/DL (ref 3.4–5)
ALP SERPL-CCNC: 39 U/L (ref 33–136)
ALT SERPL W P-5'-P-CCNC: 35 U/L (ref 10–52)
ANION GAP SERPL CALC-SCNC: 9 MMOL/L (ref 10–20)
AST SERPL W P-5'-P-CCNC: 30 U/L (ref 9–39)
BILIRUB SERPL-MCNC: 2.6 MG/DL (ref 0–1.2)
BUN SERPL-MCNC: 19 MG/DL (ref 6–23)
CALCIUM SERPL-MCNC: 9.5 MG/DL (ref 8.6–10.3)
CHLORIDE SERPL-SCNC: 104 MMOL/L (ref 98–107)
CO2 SERPL-SCNC: 27 MMOL/L (ref 21–32)
CREAT SERPL-MCNC: 0.84 MG/DL (ref 0.5–1.3)
EGFRCR SERPLBLD CKD-EPI 2021: >90 ML/MIN/1.73M*2
GLUCOSE SERPL-MCNC: 99 MG/DL (ref 74–99)
LDH SERPL L TO P-CCNC: 186 U/L (ref 84–246)
POTASSIUM SERPL-SCNC: 4.4 MMOL/L (ref 3.5–5.3)
PROT SERPL-MCNC: 6.7 G/DL (ref 6.4–8.2)
SODIUM SERPL-SCNC: 136 MMOL/L (ref 136–145)

## 2025-04-28 LAB — PATH REVIEW-CBC DIFFERENTIAL: NORMAL

## 2025-07-07 DIAGNOSIS — E03.9 ADULT HYPOTHYROIDISM: ICD-10-CM

## 2025-07-07 RX ORDER — LEVOTHYROXINE SODIUM 50 UG/1
50 TABLET ORAL DAILY
Qty: 90 TABLET | Refills: 3 | Status: SHIPPED | OUTPATIENT
Start: 2025-07-07 | End: 2026-07-07

## 2025-07-25 ENCOUNTER — APPOINTMENT (OUTPATIENT)
Dept: PRIMARY CARE | Facility: CLINIC | Age: 75
End: 2025-07-25
Payer: MEDICARE

## 2025-08-18 ENCOUNTER — APPOINTMENT (OUTPATIENT)
Dept: HEMATOLOGY/ONCOLOGY | Facility: CLINIC | Age: 75
End: 2025-08-18
Payer: MEDICARE

## 2025-08-25 ENCOUNTER — APPOINTMENT (OUTPATIENT)
Dept: HEMATOLOGY/ONCOLOGY | Facility: CLINIC | Age: 75
End: 2025-08-25
Payer: MEDICARE

## 2025-08-26 ENCOUNTER — OFFICE VISIT (OUTPATIENT)
Dept: HEMATOLOGY/ONCOLOGY | Facility: CLINIC | Age: 75
End: 2025-08-26
Payer: MEDICARE

## 2025-08-26 VITALS
TEMPERATURE: 97.5 F | WEIGHT: 252.87 LBS | BODY MASS INDEX: 34.29 KG/M2 | OXYGEN SATURATION: 94 % | SYSTOLIC BLOOD PRESSURE: 127 MMHG | RESPIRATION RATE: 16 BRPM | DIASTOLIC BLOOD PRESSURE: 83 MMHG | HEART RATE: 66 BPM

## 2025-08-26 DIAGNOSIS — N40.0 BENIGN PROSTATIC HYPERPLASIA WITH ELEVATED PROSTATE SPECIFIC ANTIGEN (PSA): ICD-10-CM

## 2025-08-26 DIAGNOSIS — C91.10 CLL (CHRONIC LYMPHOCYTIC LEUKEMIA) (MULTI): ICD-10-CM

## 2025-08-26 DIAGNOSIS — R97.20 BENIGN PROSTATIC HYPERPLASIA WITH ELEVATED PROSTATE SPECIFIC ANTIGEN (PSA): ICD-10-CM

## 2025-08-26 DIAGNOSIS — D72.820 MONOCLONAL B-CELL LYMPHOCYTOSIS: ICD-10-CM

## 2025-08-26 LAB
ALBUMIN SERPL BCP-MCNC: 4.4 G/DL (ref 3.4–5)
ALP SERPL-CCNC: 37 U/L (ref 33–136)
ALT SERPL W P-5'-P-CCNC: 39 U/L (ref 10–52)
ANION GAP SERPL CALC-SCNC: 12 MMOL/L (ref 10–20)
AST SERPL W P-5'-P-CCNC: 35 U/L (ref 9–39)
BASOPHILS # BLD AUTO: 0.04 X10*3/UL (ref 0–0.1)
BASOPHILS NFR BLD AUTO: 0.3 %
BILIRUB SERPL-MCNC: 1.8 MG/DL (ref 0–1.2)
BUN SERPL-MCNC: 19 MG/DL (ref 6–23)
CALCIUM SERPL-MCNC: 9.2 MG/DL (ref 8.6–10.3)
CHLORIDE SERPL-SCNC: 101 MMOL/L (ref 98–107)
CO2 SERPL-SCNC: 28 MMOL/L (ref 21–32)
CREAT SERPL-MCNC: 0.9 MG/DL (ref 0.5–1.3)
EGFRCR SERPLBLD CKD-EPI 2021: 90 ML/MIN/1.73M*2
EOSINOPHIL # BLD AUTO: 0.22 X10*3/UL (ref 0–0.4)
EOSINOPHIL NFR BLD AUTO: 1.4 %
ERYTHROCYTE [DISTWIDTH] IN BLOOD BY AUTOMATED COUNT: 13.7 % (ref 11.5–14.5)
GLUCOSE SERPL-MCNC: 97 MG/DL (ref 74–99)
HCT VFR BLD AUTO: 51.1 % (ref 41–52)
HGB BLD-MCNC: 16.9 G/DL (ref 13.5–17.5)
IMM GRANULOCYTES # BLD AUTO: 0.03 X10*3/UL (ref 0–0.5)
IMM GRANULOCYTES NFR BLD AUTO: 0.2 % (ref 0–0.9)
LDH SERPL L TO P-CCNC: 289 U/L (ref 84–246)
LYMPHOCYTES # BLD AUTO: 10.88 X10*3/UL (ref 0.8–3)
LYMPHOCYTES NFR BLD AUTO: 69.5 %
MCH RBC QN AUTO: 29.8 PG (ref 26–34)
MCHC RBC AUTO-ENTMCNC: 33.1 G/DL (ref 32–36)
MCV RBC AUTO: 90 FL (ref 80–100)
MONOCYTES # BLD AUTO: 0.76 X10*3/UL (ref 0.05–0.8)
MONOCYTES NFR BLD AUTO: 4.9 %
NEUTROPHILS # BLD AUTO: 3.72 X10*3/UL (ref 1.6–5.5)
NEUTROPHILS NFR BLD AUTO: 23.7 %
NRBC BLD-RTO: ABNORMAL /100{WBCS}
PLATELET # BLD AUTO: 150 X10*3/UL (ref 150–450)
POTASSIUM SERPL-SCNC: 4.6 MMOL/L (ref 3.5–5.3)
PROT SERPL-MCNC: 7 G/DL (ref 6.4–8.2)
RBC # BLD AUTO: 5.67 X10*6/UL (ref 4.5–5.9)
RBC MORPH BLD: NORMAL
SODIUM SERPL-SCNC: 136 MMOL/L (ref 136–145)
WBC # BLD AUTO: 15.7 X10*3/UL (ref 4.4–11.3)

## 2025-08-26 PROCEDURE — 84075 ASSAY ALKALINE PHOSPHATASE: CPT | Performed by: PHYSICIAN ASSISTANT

## 2025-08-26 PROCEDURE — 1159F MED LIST DOCD IN RCRD: CPT | Performed by: PHYSICIAN ASSISTANT

## 2025-08-26 PROCEDURE — 99214 OFFICE O/P EST MOD 30 MIN: CPT | Performed by: PHYSICIAN ASSISTANT

## 2025-08-26 PROCEDURE — 1126F AMNT PAIN NOTED NONE PRSNT: CPT | Performed by: PHYSICIAN ASSISTANT

## 2025-08-26 PROCEDURE — 3079F DIAST BP 80-89 MM HG: CPT | Performed by: PHYSICIAN ASSISTANT

## 2025-08-26 PROCEDURE — 85025 COMPLETE CBC W/AUTO DIFF WBC: CPT | Performed by: PHYSICIAN ASSISTANT

## 2025-08-26 PROCEDURE — 83615 LACTATE (LD) (LDH) ENZYME: CPT | Performed by: PHYSICIAN ASSISTANT

## 2025-08-26 PROCEDURE — 36415 COLL VENOUS BLD VENIPUNCTURE: CPT | Performed by: PHYSICIAN ASSISTANT

## 2025-08-26 PROCEDURE — 3074F SYST BP LT 130 MM HG: CPT | Performed by: PHYSICIAN ASSISTANT

## 2025-08-26 ASSESSMENT — PAIN SCALES - GENERAL: PAINLEVEL_OUTOF10: 0-NO PAIN

## 2025-11-11 ENCOUNTER — APPOINTMENT (OUTPATIENT)
Dept: PRIMARY CARE | Facility: CLINIC | Age: 75
End: 2025-11-11
Payer: MEDICARE